# Patient Record
Sex: FEMALE | Race: WHITE | NOT HISPANIC OR LATINO | Employment: FULL TIME | URBAN - METROPOLITAN AREA
[De-identification: names, ages, dates, MRNs, and addresses within clinical notes are randomized per-mention and may not be internally consistent; named-entity substitution may affect disease eponyms.]

---

## 2018-07-26 ENCOUNTER — TELEPHONE (OUTPATIENT)
Dept: RADIOLOGY | Facility: MEDICAL CENTER | Age: 33
End: 2018-07-26

## 2018-07-28 ENCOUNTER — OFFICE VISIT (OUTPATIENT)
Dept: URGENT CARE | Facility: CLINIC | Age: 33
End: 2018-07-28
Payer: COMMERCIAL

## 2018-07-28 VITALS
HEART RATE: 64 BPM | OXYGEN SATURATION: 97 % | DIASTOLIC BLOOD PRESSURE: 70 MMHG | BODY MASS INDEX: 21.67 KG/M2 | SYSTOLIC BLOOD PRESSURE: 116 MMHG | WEIGHT: 143 LBS | HEIGHT: 68 IN | RESPIRATION RATE: 16 BRPM | TEMPERATURE: 97 F

## 2018-07-28 DIAGNOSIS — H10.89 OTHER CONJUNCTIVITIS OF LEFT EYE: ICD-10-CM

## 2018-07-28 PROCEDURE — 99202 OFFICE O/P NEW SF 15 MIN: CPT | Performed by: PHYSICIAN ASSISTANT

## 2018-07-28 RX ORDER — OFLOXACIN 3 MG/ML
SOLUTION/ DROPS OPHTHALMIC
Qty: 5 ML | Refills: 0 | Status: SHIPPED | OUTPATIENT
Start: 2018-07-28 | End: 2018-09-14

## 2018-07-28 RX ORDER — ERYTHROMYCIN 5 MG/G
OINTMENT OPHTHALMIC
Qty: 1 TUBE | Refills: 0 | Status: SHIPPED | OUTPATIENT
Start: 2018-07-28 | End: 2018-09-14

## 2018-07-28 ASSESSMENT — ENCOUNTER SYMPTOMS
NAUSEA: 0
EYE PAIN: 0
EYE DISCHARGE: 1
BLURRED VISION: 0
EYE REDNESS: 1
PHOTOPHOBIA: 0
EYE ITCHING: 0
DOUBLE VISION: 0
CONSTITUTIONAL NEGATIVE: 1
FOREIGN BODY SENSATION: 0
FEVER: 0

## 2018-07-28 NOTE — PROGRESS NOTES
"Subjective:      Carmen Brown is a 33 y.o. female who presents with Conjunctivitis ((R) eye redness and itchiness x 1 day)            Eye Problem    The left eye is affected. This is a new (redn, irrit, dc) problem. The current episode started today. The problem occurs constantly. The problem has been unchanged. There was no injury mechanism. Associated symptoms include an eye discharge and eye redness. Pertinent negatives include no blurred vision, double vision, fever, foreign body sensation, itching, nausea, photophobia or recent URI. She has tried nothing for the symptoms. The treatment provided no relief.       Review of Systems   Constitutional: Negative.  Negative for fever.   HENT: Negative.    Eyes: Positive for discharge and redness. Negative for blurred vision, double vision, photophobia, pain and itching.   Gastrointestinal: Negative for nausea.   Skin: Negative.           Objective:     /70   Pulse 64   Temp 36.1 °C (97 °F)   Resp 16   Ht 1.727 m (5' 8\")   Wt 64.9 kg (143 lb)   SpO2 97%   BMI 21.74 kg/m²      Physical Exam   Constitutional: She appears well-developed and well-nourished. No distress.   HENT:   Head: Normocephalic and atraumatic.   Eyes: Pupils are equal, round, and reactive to light. EOM are normal.   Neck: Normal range of motion. Neck supple.   Skin: Skin is warm and dry.   Psychiatric: She has a normal mood and affect. Her behavior is normal.   Nursing note and vitals reviewed.    Active Ambulatory Problems     Diagnosis Date Noted   • No Active Ambulatory Problems     Resolved Ambulatory Problems     Diagnosis Date Noted   • No Resolved Ambulatory Problems     No Additional Past Medical History     No current outpatient prescriptions on file prior to visit.     No current facility-administered medications on file prior to visit.      Social History     Social History   • Marital status: Single     Spouse name: N/A   • Number of children: N/A   • Years of education: N/A "     Occupational History   • Not on file.     Social History Main Topics   • Smoking status: Former Smoker     Types: Cigarettes   • Smokeless tobacco: Never Used   • Alcohol use Not on file   • Drug use: Unknown   • Sexual activity: Not on file     Other Topics Concern   • Not on file     Social History Narrative   • No narrative on file     History reviewed. No pertinent family history.  Patient has no known allergies.              Assessment/Plan:     ·  OS conj      · rx abx

## 2018-08-01 ENCOUNTER — HOSPITAL ENCOUNTER (OUTPATIENT)
Dept: RADIOLOGY | Facility: MEDICAL CENTER | Age: 33
End: 2018-08-01
Attending: OBSTETRICS & GYNECOLOGY
Payer: COMMERCIAL

## 2018-08-01 DIAGNOSIS — N63.0 LUMP OR MASS IN BREAST: ICD-10-CM

## 2018-08-01 PROCEDURE — 76642 ULTRASOUND BREAST LIMITED: CPT | Mod: LT

## 2018-08-01 PROCEDURE — G0279 TOMOSYNTHESIS, MAMMO: HCPCS

## 2018-09-14 ENCOUNTER — OFFICE VISIT (OUTPATIENT)
Dept: INTERNAL MEDICINE | Facility: MEDICAL CENTER | Age: 33
End: 2018-09-14
Payer: COMMERCIAL

## 2018-09-14 VITALS
HEART RATE: 74 BPM | HEIGHT: 68 IN | OXYGEN SATURATION: 97 % | WEIGHT: 146.6 LBS | BODY MASS INDEX: 22.22 KG/M2 | TEMPERATURE: 98.6 F | SYSTOLIC BLOOD PRESSURE: 110 MMHG | DIASTOLIC BLOOD PRESSURE: 60 MMHG

## 2018-09-14 DIAGNOSIS — Z00.00 PREVENTATIVE HEALTH CARE: ICD-10-CM

## 2018-09-14 DIAGNOSIS — Z13.29 SCREENING FOR ENDOCRINE, METABOLIC, AND IMMUNITY DISORDER: ICD-10-CM

## 2018-09-14 DIAGNOSIS — G89.29 CHRONIC ABDOMINAL PAIN: ICD-10-CM

## 2018-09-14 DIAGNOSIS — B19.20 HEPATITIS C VIRUS INFECTION WITHOUT HEPATIC COMA, UNSPECIFIED CHRONICITY: ICD-10-CM

## 2018-09-14 DIAGNOSIS — Z13.0 SCREENING FOR ENDOCRINE, METABOLIC, AND IMMUNITY DISORDER: ICD-10-CM

## 2018-09-14 DIAGNOSIS — Z13.228 SCREENING FOR ENDOCRINE, METABOLIC, AND IMMUNITY DISORDER: ICD-10-CM

## 2018-09-14 DIAGNOSIS — R10.9 CHRONIC ABDOMINAL PAIN: ICD-10-CM

## 2018-09-14 PROCEDURE — 99204 OFFICE O/P NEW MOD 45 MIN: CPT | Mod: GC | Performed by: INTERNAL MEDICINE

## 2018-09-14 ASSESSMENT — PATIENT HEALTH QUESTIONNAIRE - PHQ9: CLINICAL INTERPRETATION OF PHQ2 SCORE: 0

## 2018-09-14 NOTE — LETTER
[facility]  Paty Scott M.D.  [address] [facility] [address]  [place] [address]  Fax: [phone]   Authorization for Release/Disclosure of   Protected Health Information   Name: WESLY BROWN : 1985 SSN: [id number]   [place] [address] [facility] [place] [address] Phone:    [phone] (home) [phone] (work)   I authorize the entity listed below to release/disclose the PHI below to:   [facility]/Paty Scott M.D. and Paty Scott M.D.   Provider or Entity Name: Dr. Omega NICOLE     Address   City, [facility]   Phone:      Fax:     Reason for request: continuity of care   Information to be released:    [  ] LAST COLONOSCOPY,  including any PATH REPORT and follow-up  [  ] LAST FIT/COLOGUARD RESULT [  ] LAST DEXA  [  ] LAST MAMMOGRAM  [ x ] LAST PAP  [  ] LAST LABS [  ] RETINA EXAM REPORT  [  ] IMMUNIZATION RECORDS  [  ] Release all info      [  ] Check here and initial the line next to each item to release ALL health information INCLUDING  _____ Care and treatment for drug and / or alcohol abuse  _____ HIV testing, infection status, or AIDS  _____ Genetic Testing    DATES OF SERVICE OR TIME PERIOD TO BE DISCLOSED: _____________  I understand and acknowledge that:  * This Authorization may be revoked at any time by you in writing, except if your health information has already been used or disclosed.  * Your health information that will be used or disclosed as a result of you signing this authorization could be re-disclosed by the recipient. If this occurs, your re-disclosed health information may no longer be protected by State or Federal laws.  * You may refuse to sign this Authorization. Your refusal will not affect your ability to obtain treatment.  * This Authorization becomes effective upon signing and will  on (date) __________.      If no date is indicated, this Authorization will  one (1) year from the signature date.    Name: Wesly Brown    Signature:   Date:     2018       PLEASE FAX REQUESTED RECORDS BACK TO: [phone]

## 2018-09-14 NOTE — PATIENT INSTRUCTIONS
Hepatitis C  Hepatitis C is a liver infection. It is caused by a germ that can spread through blood and other bodily fluids. Your doctor will use blood and liver tests to:  · Check for this infection.  · Decide how to treat you.  · Check your health after treatment.  Follow these instructions at home:  · Rest.  · Do not take any medicine unless your doctor says it is okay. This includes over-the-counter medicine and birth control pills.  · Do not drink alcohol.  · Do not have sex until your doctor says it is okay.  · Do not share toothbrushes, nail clippers, razors, or needles.  · Take all medicines as told by your doctor.  Contact a doctor if:  · You have a fever.  · Your belly (abdomen) hurts.  · Your pee (urine) is dark.  · Your poop (bowel movement) is the color of spencer.  · You have joint pain.  Get help right away if:  · You feel more and more tired (fatigued).  · You feel more and more weak.  · You do not feel like eating.  · You feel sick to your stomach (nauseous) or throw up (vomit).  · Your skin or the whites of your eyes turn yellow (jaundice) or turn more yellow than they were before.  · You bruise or bleed easily.  This information is not intended to replace advice given to you by your health care provider. Make sure you discuss any questions you have with your health care provider.  Document Released: 11/30/2009 Document Revised: 05/25/2017 Document Reviewed: 04/01/2015  [employer] Interactive Patient Education © 2017 [employer]  Abdominal Pain, Adult  Many things can cause belly (abdominal) pain. Most times, belly pain is not dangerous. Many cases of belly pain can be watched and treated at home. Sometimes belly pain is serious, though. Your doctor will try to find the cause of your belly pain.  Follow these instructions at home:  · Take over-the-counter and prescription medicines only as told by your doctor. Do not take medicines that help you poop (laxatives) unless told to by your  doctor.  · Drink enough fluid to keep your pee (urine) clear or pale yellow.  · Watch your belly pain for any changes.  · Keep all follow-up visits as told by your doctor. This is important.  Contact a doctor if:  · Your belly pain changes or gets worse.  · You are not hungry, or you lose weight without trying.  · You are having trouble pooping (constipated) or have watery poop (diarrhea) for more than 2-3 days.  · You have pain when you pee or poop.  · Your belly pain wakes you up at night.  · Your pain gets worse with meals, after eating, or with certain foods.  · You are throwing up and cannot keep anything down.  · You have a fever.  Get help right away if:  · Your pain does not go away as soon as your doctor says it should.  · You cannot stop throwing up.  · Your pain is only in areas of your belly, such as the right side or the left lower part of the belly.  · You have bloody or black poop, or poop that looks like tar.  · You have very bad pain, cramping, or bloating in your belly.  · You have signs of not having enough fluid or water in your body (dehydration), such as:  ¨ Dark pee, very little pee, or no pee.  ¨ Cracked lips.  ¨ Dry mouth.  ¨ Sunken eyes.  ¨ Sleepiness.  ¨ Weakness.  This information is not intended to replace advice given to you by your health care provider. Make sure you discuss any questions you have with your health care provider.  Document Released: 06/05/2009 Document Revised: 07/07/2017 Document Reviewed: 05/31/2017  [employer] Interactive Patient Education © 2017 [employer]

## 2018-09-14 NOTE — PROGRESS NOTES
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 Although she takes spicy foods she is not sure if this is related to that.    She endorses to be positive for hepatitis C serology from early 19 years of age, which she attributes for a transplacental  transmission from her mother who is HCV positive and has been treated with interferon. She had severe reactions to it. Scared with the sufferings her mother went through from interferon treatment,  she decided not to take treatment for hepatitis C.    Currently she declined any nausea, vomiting, GERD, postprandial bloating, diarrhea, constipation, fever, chills or any jaundice.    Personal history significant for; heavy abuse of cocaine, methamphetamine, marijuana during teenage years, but never injected IV drugs. She reported her nasal septum completely damaged from snorting of cocaine.     Currently  lives with her  and 7-year-old son, and is planning for second pregnancy, has undergone IUD removal last menstrual period 8/15/2018        Patient Active Problem List    Diagnosis Date Noted   • Chronic abdominal pain 09/14/2018   • Vitreous floaters of both eyes 12/05/2016   • Foot cramps 12/05/2016   • Need for vaccination 12/05/2016   • Preventative health care 12/05/2016   • Herpes genitalis in women 08/02/2011   • Smoker 03/21/2011   • Hepatitis C        Past Medical History:   Diagnosis Date   • Hepatitis C Dx 2002    no Txment       Current Outpatient Prescriptions   Medication Sig Dispense Refill   • tobramycin (TOBREX) 0.3 %  "Solution ophthalmic solution Place 1 Drop in right eye 4 times a day. 1 Bottle 0     No current facility-administered medications for this visit.        Allergies as of 09/14/2018   • (No Known Allergies)       Social History     Social History   • Marital status:      Spouse name: N/A   • Number of children: 1   • Years of education: Some college     Occupational History   •       [employer]      Social History Main Topics   • Smoking status: Former Smoker     Packs/day: 0.50     Years: 15.00     Types: Cigarettes     Start date: 1/1/1999     Quit date: 5/4/2018   • Smokeless tobacco: Never Used      Comment: june 2018 quit   • Alcohol use 0.6 oz/week     1 Shots of liquor per week      Comment: Only on weekends   • Drug use: Yes     Types: Marijuana, Methamphetamines, Cocaine      Comment: last used 05/10   • Sexual activity: Yes     Partners: Male      Comment: unplanned pregnancy, FOB is not supportive     Other Topics Concern   • Seat Belt Yes     Social History Narrative    Lives with  and son 7 yrs old            Family History   Problem Relation Age of Onset   • Lung Disease Maternal Aunt         smoker   • Lung Disease Mother         smoker   • Hyperlipidemia Mother    • Diabetes Sister         no meds   • Lung Disease Maternal Grandmother    • Cancer Paternal Grandmother         Breast       Past Surgical History:   Procedure Laterality Date   • PRIMARY C SECTION  9/13/2011    Performed by JEREMIE MIRAMONTES at [facility]       ROS: As per HPI. Additional pertinent symptoms as noted below.    Negative for jaundice  Negative for arthralgias  Negative for anorexia, weight loss  Negative for hematochezia or melena  Negative for pruritus    /60   Pulse 74   Temp 37 °C (98.6 °F)   Ht 1.727 m (5' 8\")   Wt 66.5 kg (146 lb 9.6 oz)   SpO2 97%   BMI 22.29 kg/m²     Physical Exam;   done in presence of female chaperone TYLER Christianson    General:  " Pleasant patient, average built , afebrile, anicteric     Alert and oriented, No apparent distress.    Eyes: Pupils equal and reactive. No scleral icterus.    Nose; severely atrophied mucosa and nasal septum completely perforated brownish-black-colored    Throat: Clear no erythema or exudates noted.    Neck: Supple. No lymphadenopathy noted. Thyroid not enlarged.    Lungs: Clear to auscultation and percussion bilaterally.    Cardiovascular: Regular rate and rhythm. No murmurs, rubs or gallops.    Abdomen:  Soft nontender no hepatosplenomegaly   Bowel sounds normal   No masses felt       Extremities: No clubbing, cyanosis, edema.    Skin: Clear. No rash or suspicious skin lesions noted.  Several tattoos all over the abdominal wall    Other:   Mood and affect normal    Assessment and Plan    1. Chronic abdominal pain  2. 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 Preventative health care  Patient is due for influenza immunization  Patient declined vaccine    4. Screening for endocrine, metabolic, and immunity disorder  Lipid panel, hemoglobin A1c, TSH    Follow up in 3 months after lab workup and infectious disease opinion      Risk Assessment (discuss potential complications a function of chronic problems): As above    Complexity (discuss number of co-morbidities): As above    Signed by: Paty  JOCELYN Scott.

## 2018-09-27 LAB
ALBUMIN SERPL-MCNC: 4.5 G/DL (ref 3.5–5.5)
ALBUMIN/GLOB SERPL: 1.6 {RATIO} (ref 1.2–2.2)
ALP SERPL-CCNC: 69 IU/L (ref 39–117)
ALT SERPL-CCNC: 23 IU/L (ref 0–32)
AST SERPL-CCNC: 22 IU/L (ref 0–40)
BASOPHILS # BLD AUTO: 0 X10E3/UL (ref 0–0.2)
BASOPHILS NFR BLD AUTO: 0 %
BILIRUB SERPL-MCNC: 1.5 MG/DL (ref 0–1.2)
BUN SERPL-MCNC: 12 MG/DL (ref 6–20)
BUN/CREAT SERPL: 14 (ref 9–23)
CALCIUM SERPL-MCNC: 9.2 MG/DL (ref 8.7–10.2)
CHLORIDE SERPL-SCNC: 104 MMOL/L (ref 96–106)
CHOLEST SERPL-MCNC: 137 MG/DL (ref 100–199)
CO2 SERPL-SCNC: 22 MMOL/L (ref 20–29)
CREAT SERPL-MCNC: 0.85 MG/DL (ref 0.57–1)
EOSINOPHIL # BLD AUTO: 0.1 X10E3/UL (ref 0–0.4)
EOSINOPHIL NFR BLD AUTO: 3 %
ERYTHROCYTE [DISTWIDTH] IN BLOOD BY AUTOMATED COUNT: 13.2 % (ref 12.3–15.4)
GLOBULIN SER CALC-MCNC: 2.9 G/DL (ref 1.5–4.5)
GLUCOSE SERPL-MCNC: 90 MG/DL (ref 65–99)
HAV IGM SERPL QL IA: NEGATIVE
HBA1C MFR BLD: 5.3 % (ref 4.8–5.6)
HBV CORE IGM SERPL QL IA: NEGATIVE
HBV SURFACE AG SERPL QL IA: NEGATIVE
HCT VFR BLD AUTO: 42 % (ref 34–46.6)
HCV AB S/CO SERPL IA: >11 S/CO RATIO (ref 0–0.9)
HCV GENTYP SERPL NAA+PROBE: NORMAL
HCV RNA SERPL NAA+PROBE-ACNC: NORMAL IU/ML
HCV RNA SERPL NAA+PROBE-LOG IU: 6.58 LOG10 IU/ML
HDLC SERPL-MCNC: 46 MG/DL
HEPATITIS C GENOTYPING 551222: NORMAL
HGB BLD-MCNC: 14.5 G/DL (ref 11.1–15.9)
IF AFRICAN AMERICAN  100797: 103 ML/MIN/1.73
IF NON AFRICAN AMER 100791: 90 ML/MIN/1.73
IMM GRANULOCYTES # BLD: 0 X10E3/UL (ref 0–0.1)
IMM GRANULOCYTES NFR BLD: 0 %
IMMATURE CELLS  115398: NORMAL
LABORATORY COMMENT REPORT: NORMAL
LABORATORY COMMENT REPORT: NORMAL
LDLC SERPL CALC-MCNC: 78 MG/DL (ref 0–99)
LYMPHOCYTES # BLD AUTO: 1.5 X10E3/UL (ref 0.7–3.1)
LYMPHOCYTES NFR BLD AUTO: 34 %
MCH RBC QN AUTO: 30.7 PG (ref 26.6–33)
MCHC RBC AUTO-ENTMCNC: 34.5 G/DL (ref 31.5–35.7)
MCV RBC AUTO: 89 FL (ref 79–97)
MONOCYTES # BLD AUTO: 0.5 X10E3/UL (ref 0.1–0.9)
MONOCYTES NFR BLD AUTO: 11 %
MORPHOLOGY BLD-IMP: NORMAL
NEUTROPHILS # BLD AUTO: 2.3 X10E3/UL (ref 1.4–7)
NEUTROPHILS NFR BLD AUTO: 52 %
NRBC BLD AUTO-RTO: NORMAL %
PLATELET # BLD AUTO: 227 X10E3/UL (ref 150–379)
POTASSIUM SERPL-SCNC: 4.4 MMOL/L (ref 3.5–5.2)
PROT SERPL-MCNC: 7.4 G/DL (ref 6–8.5)
RBC # BLD AUTO: 4.73 X10E6/UL (ref 3.77–5.28)
SODIUM SERPL-SCNC: 139 MMOL/L (ref 134–144)
TEST INFO 550306: NORMAL
TRIGL SERPL-MCNC: 63 MG/DL (ref 0–149)
VLDLC SERPL CALC-MCNC: 13 MG/DL (ref 5–40)
WBC # BLD AUTO: 4.5 X10E3/UL (ref 3.4–10.8)

## 2018-09-28 ENCOUNTER — TELEPHONE (OUTPATIENT)
Dept: INTERNAL MEDICINE | Facility: MEDICAL CENTER | Age: 33
End: 2018-09-28

## 2018-09-28 NOTE — TELEPHONE ENCOUNTER
Incoming referral from Dr Scott to Dr Castro.  Dx: Hepatitis C      Pt got all labs completed except fibrosure.   Per a telephone encounter, pt has a high co-pay for her to get her U/S done.       Please review and advise.

## 2018-09-28 NOTE — TELEPHONE ENCOUNTER
1. Caller Name: Pt                      Call Back Number: [phone] (home) [phone] (work)    2. Message: Patient called and left a message stating she would like her lab results. She also has a question stating that she was told her co-pay would be really high for the US and is wondering if she should she still keep that appt.    3. Patient approves office to leave a detailed voicemail/MyChart message: N\A

## 2018-10-02 ENCOUNTER — HOSPITAL ENCOUNTER (OUTPATIENT)
Dept: RADIOLOGY | Facility: MEDICAL CENTER | Age: 33
End: 2018-10-02
Attending: STUDENT IN AN ORGANIZED HEALTH CARE EDUCATION/TRAINING PROGRAM
Payer: COMMERCIAL

## 2018-10-02 DIAGNOSIS — B19.20 HEPATITIS C VIRUS INFECTION WITHOUT HEPATIC COMA, UNSPECIFIED CHRONICITY: ICD-10-CM

## 2018-10-02 DIAGNOSIS — R10.9 CHRONIC ABDOMINAL PAIN: ICD-10-CM

## 2018-10-02 DIAGNOSIS — G89.29 CHRONIC ABDOMINAL PAIN: ICD-10-CM

## 2018-10-02 PROCEDURE — 76700 US EXAM ABDOM COMPLETE: CPT

## 2018-10-02 NOTE — TELEPHONE ENCOUNTER
Called the patient and left voice mail, as she did not  my phone.    Given her Hepatitis C we have ordered US liver.

## 2018-10-12 NOTE — TELEPHONE ENCOUNTER
Called and spoke with pt. Pt said she does not want to start the process on the tx yet. But would like to discuss this first with Dr Scott at her appt. I told pt I'm going to close her referral and when she decides that she would like to see Dr Castro she would have to ask her PCP for a new referral. Pt understood.

## 2019-01-09 ENCOUNTER — OFFICE VISIT (OUTPATIENT)
Dept: INTERNAL MEDICINE | Facility: MEDICAL CENTER | Age: 34
End: 2019-01-09
Payer: COMMERCIAL

## 2019-01-09 VITALS
TEMPERATURE: 98 F | WEIGHT: 152 LBS | SYSTOLIC BLOOD PRESSURE: 110 MMHG | HEIGHT: 68 IN | OXYGEN SATURATION: 98 % | HEART RATE: 80 BPM | BODY MASS INDEX: 23.04 KG/M2 | DIASTOLIC BLOOD PRESSURE: 70 MMHG

## 2019-01-09 DIAGNOSIS — G89.29 CHRONIC ABDOMINAL PAIN: ICD-10-CM

## 2019-01-09 DIAGNOSIS — B19.20 HEPATITIS C VIRUS INFECTION WITHOUT HEPATIC COMA, UNSPECIFIED CHRONICITY: ICD-10-CM

## 2019-01-09 DIAGNOSIS — R10.9 CHRONIC ABDOMINAL PAIN: ICD-10-CM

## 2019-01-09 PROCEDURE — 99213 OFFICE O/P EST LOW 20 MIN: CPT | Mod: GE | Performed by: INTERNAL MEDICINE

## 2019-01-09 ASSESSMENT — PATIENT HEALTH QUESTIONNAIRE - PHQ9: CLINICAL INTERPRETATION OF PHQ2 SCORE: 0

## 2019-01-09 NOTE — PROGRESS NOTES
"Established Patient    Carmen presents today with the following:    CC: hepatitis C, and abdominal pain follow up of lab workup.    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 Currently she  reports her pain has gotten better, and declined any acute symptoms.    Her Abdominal Ultrasound showed  small kidney stone (8.1 mm shadowing nonobstructing stone within the mid left kidney), The liver is normal in contour. There is no evidence of solid mass lesion. The liver measures 15.19 cm.    Her Lab work up shows elevated HCV antibody levels 11.o and Quant 3,850,000, with Genotype 2a/2c, however she is not willing for any treatment at this time. AST/ALt are normal.      Patient Active Problem List    Diagnosis Date Noted   • Chronic abdominal pain 09/14/2018   • Vitreous floaters of both eyes 12/05/2016   • Foot cramps 12/05/2016   • Need for vaccination 12/05/2016   • Preventative health care 12/05/2016   • Herpes genitalis in women 08/02/2011   • Smoker 03/21/2011   • Hepatitis C        No current outpatient prescriptions on file.     No current facility-administered medications for this visit.        ROS: As per HPI. Additional pertinent symptoms as noted below.    Negative for nausea, vomiting, jaundice or hematochezia    /70 (BP Location: Left arm, Patient Position: Sitting, BP Cuff Size: Adult)   Pulse 80   Temp 36.7 °C (98 °F) (Temporal)   Ht 1.727 m (5' 8\")   Wt 68.9 kg (152 lb)   SpO2 98%   BMI 23.11 kg/m²     Physical Exam   Constitutional:  oriented to person, place, and time. No distress.   Eyes: Pupils are equal, round, and reactive to light. No scleral icterus.  Neck: Neck supple. No thyromegaly present.   Cardiovascular: Normal rate, regular rhythm and normal heart sounds.  Exam reveals no " gallop and no friction rub.  No murmur heard.  Pulmonary/Chest: Breath sounds normal. Chest wall is not dull to percussion.   Musculoskeletal:   no edema.   Lymphadenopathy: no cervical adenopathy  Neurological: alert and oriented to person, place, and time.   Skin: No cyanosis. Nails show no clubbing.  Other: Mood and affect normal      Assessment and Plan    1. Hepatitis C virus infection without hepatic coma, unspecified chronicity  Hx of Hep C from the age of 16, pt has not taken any treatment    Labs done on 9/22/2018 show:    Component Value Ref Range & Units Status   Hepatitis A Virus Ab, IgM Negative  Negative Final   Hepatitis B Surface Antigen Negative  Negative Final   Hepatitis B Cors Ab,IgM Negative  Negative Final   Hepatitis C Antibody >11.0   0.0 - 0.9 s/co ratio Final   Comment:   Negative:     < 0.8   Indeterminate: 0.8 - 0.9   Positive:     > 0.9   The CDC recommends that a positive HCV antibody result   be followed up with a HCV Nucleic Acid Amplification   test ([id number]).          Component Value Ref Range & Units Status   Hepatitis C Quant 3,850,000  IU/mL Final   HCV Log 10 6.585  log10 IU/mL Final   Test Info: Comment   Final   Comment:   The quantitative range of this assay is 15 IU/mL to 100 million IU/mL.   Hepatitis C Genotyping Comment   Final         Lab Results   Component Value Date/Time    CHOLSTRLTOT 137 09/22/2018 09:24 AM    CHOLSTRLTOT 114 12/17/2016 09:11 AM    LDL 78 09/22/2018 09:24 AM    LDL 67 12/17/2016 09:11 AM    HDL 46 09/22/2018 09:24 AM    HDL 37 (A) 12/17/2016 09:11 AM    TRIGLYCERIDE 63 09/22/2018 09:24 AM    TRIGLYCERIDE 52 12/17/2016 09:11 AM       Lab Results   Component Value Date/Time    SODIUM 139 09/22/2018 09:24 AM    SODIUM 139 12/17/2016 09:11 AM    POTASSIUM 4.4 09/22/2018 09:24 AM    POTASSIUM 4.5 12/17/2016 09:11 AM    CHLORIDE 104 09/22/2018 09:24 AM    CHLORIDE 108 12/17/2016 09:11 AM    CO2 22 09/22/2018 09:24 AM    CO2 27 12/17/2016 09:11 AM     GLUCOSE 90 09/22/2018 09:24 AM    GLUCOSE 76 12/17/2016 09:11 AM    BUN 12 09/22/2018 09:24 AM    BUN 12 12/17/2016 09:11 AM    CREATININE 0.85 09/22/2018 09:24 AM    CREATININE 0.77 12/17/2016 09:11 AM    BUNCREATRAT 14 09/22/2018 09:24 AM     Lab Results   Component Value Date/Time    ALKPHOSPHAT 69 09/22/2018 09:24 AM    ALKPHOSPHAT 48 12/17/2016 09:11 AM    ASTSGOT 22 09/22/2018 09:24 AM    ASTSGOT 22 12/17/2016 09:11 AM    ALTSGPT 23 09/22/2018 09:24 AM    ALTSGPT 25 12/17/2016 09:11 AM    TBILIRUBIN 1.5 (H) 09/22/2018 09:24 AM    TBILIRUBIN 1.3 12/17/2016 09:11 AM      Referral infectious disease specialist was given in previous visit      2. 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  8.1 mm nonobstructing stone left kidney. No hydronephrosis.  No free fluid.  The liver is normal in contour. There is no evidence of solid mass lesion. The liver measures 15.19 cm.      Patient is advised  drinking water frequently to keep well hydrated , and see if she pass stone in urine.    If she  develop severe pain abdomen or difficulty in urination or blood in the urine, she is advised to  go to nearest ED, or call for an earlier appointment with us.    Pt wants to go for 2nd baby and is planning to establish care with OBGyn. Counseled about risk of transplacental transmission.    Otherwise Fup in 3 months.      : Paty Scott M.D.

## 2019-01-09 NOTE — PATIENT INSTRUCTIONS
Hepatitis C  Hepatitis C is a liver infection. It is caused by a germ that can spread through blood and other bodily fluids. Your doctor will use blood and liver tests to:  · Check for this infection.  · Decide how to treat you.  · Check your health after treatment.  Follow these instructions at home:  · Rest.  · Do not take any medicine unless your doctor says it is okay. This includes over-the-counter medicine and birth control pills.  · Do not drink alcohol.  · Do not have sex until your doctor says it is okay.  · Do not share toothbrushes, nail clippers, razors, or needles.  · Take all medicines as told by your doctor.  Contact a doctor if:  · You have a fever.  · Your belly (abdomen) hurts.  · Your pee (urine) is dark.  · Your poop (bowel movement) is the color of spencer.  · You have joint pain.  Get help right away if:  · You feel more and more tired (fatigued).  · You feel more and more weak.  · You do not feel like eating.  · You feel sick to your stomach (nauseous) or throw up (vomit).  · Your skin or the whites of your eyes turn yellow (jaundice) or turn more yellow than they were before.  · You bruise or bleed easily.  This information is not intended to replace advice given to you by your health care provider. Make sure you discuss any questions you have with your health care provider.  Document Released: 11/30/2009 Document Revised: 05/25/2017 Document Reviewed: 04/01/2015  [employer] Interactive Patient Education © 2017 [employer]

## 2021-06-11 ENCOUNTER — TELEPHONE (OUTPATIENT)
Dept: SCHEDULING | Facility: IMAGING CENTER | Age: 36
End: 2021-06-11

## 2021-06-24 ENCOUNTER — OFFICE VISIT (OUTPATIENT)
Dept: MEDICAL GROUP | Age: 36
End: 2021-06-24
Payer: COMMERCIAL

## 2021-06-24 VITALS
HEIGHT: 67 IN | SYSTOLIC BLOOD PRESSURE: 112 MMHG | WEIGHT: 151 LBS | OXYGEN SATURATION: 98 % | HEART RATE: 86 BPM | DIASTOLIC BLOOD PRESSURE: 64 MMHG | TEMPERATURE: 98.8 F | BODY MASS INDEX: 23.7 KG/M2

## 2021-06-24 DIAGNOSIS — B18.2 CHRONIC HEPATITIS C WITHOUT HEPATIC COMA (HCC): ICD-10-CM

## 2021-06-24 DIAGNOSIS — Z3A.01 LESS THAN 8 WEEKS GESTATION OF PREGNANCY: ICD-10-CM

## 2021-06-24 DIAGNOSIS — E55.9 VITAMIN D DEFICIENCY: ICD-10-CM

## 2021-06-24 DIAGNOSIS — N91.2 AMENORRHEA, UNSPECIFIED: ICD-10-CM

## 2021-06-24 DIAGNOSIS — R73.01 IFG (IMPAIRED FASTING GLUCOSE): ICD-10-CM

## 2021-06-24 DIAGNOSIS — Z00.00 HEALTHCARE MAINTENANCE: ICD-10-CM

## 2021-06-24 PROBLEM — G89.29 CHRONIC ABDOMINAL PAIN: Status: RESOLVED | Noted: 2018-09-14 | Resolved: 2021-06-24

## 2021-06-24 PROBLEM — R10.9 CHRONIC ABDOMINAL PAIN: Status: RESOLVED | Noted: 2018-09-14 | Resolved: 2021-06-24

## 2021-06-24 PROBLEM — R76.8 HEPATITIS C ANTIBODY TEST POSITIVE: Status: ACTIVE | Noted: 2021-06-24

## 2021-06-24 PROCEDURE — 99204 OFFICE O/P NEW MOD 45 MIN: CPT | Performed by: INTERNAL MEDICINE

## 2021-06-24 RX ORDER — CHLORAL HYDRATE 500 MG
1000 CAPSULE ORAL
Status: ON HOLD | COMMUNITY
End: 2022-01-23

## 2021-06-24 RX ORDER — MULTIVIT WITH MINERALS/LUTEIN
TABLET ORAL
Status: ON HOLD | COMMUNITY
End: 2022-01-23

## 2021-06-24 ASSESSMENT — ENCOUNTER SYMPTOMS
PSYCHIATRIC NEGATIVE: 1
GASTROINTESTINAL NEGATIVE: 1
NEUROLOGICAL NEGATIVE: 1
MUSCULOSKELETAL NEGATIVE: 1
CONSTITUTIONAL NEGATIVE: 1
EYES NEGATIVE: 1
RESPIRATORY NEGATIVE: 1
CARDIOVASCULAR NEGATIVE: 1

## 2021-06-24 ASSESSMENT — PATIENT HEALTH QUESTIONNAIRE - PHQ9: CLINICAL INTERPRETATION OF PHQ2 SCORE: 0

## 2021-06-24 NOTE — PROGRESS NOTES
Subjective:      Carmen Brown is a 36 y.o. female who presents with Establish Care and Referral Needed (OBGYN prenatal vitamins needed )  There is a new patient here to get established for now has insurance and needs referral to OB/GYN for positive urine pregnancy test.  Last mental period was 6 weeks ago.  She had one uncomplicated pregnancy 10 years ago.  No new problems or issues.  She quit smoking 2 to 3 years ago.  She has had a little bit of morning sickness but otherwise has been feeling well.    Past medical history significant for hepatitis C positivity which she believes was contracted in utero from her mother who was positive for hepatitis C.  She declined treatment at that time was discovered due to side effects of potential treatment with interferon.  Has not been inclined to seek the newer treatments with Harvoni or other medications.    Health maintenance patient is due for hepatitis B vaccination and has declined COVID-19 vaccination, along with her .    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  , one 10-year-old child at home.    Patient has no known allergies.      Outpatient Medications Prior to Visit   Medication Sig Dispense Refill   • Probiotic Product (PROBIOTIC ADVANCED PO) Take  by mouth.     • Ascorbic Acid (VITAMIN C) 1000 MG Tab Take  by mouth.     • Omega-3 Fatty Acids (FISH OIL) 1000 MG Cap capsule Take 1,000 mg by mouth 3 times a day with meals.       No facility-administered medications prior to visit.     No Known Allergies  No visits with results within 1 Month(s) from this visit.   Latest known visit with results is:   Orders Only on 09/22/2018   Component Date Value   • WBC 09/22/2018 4.5    • RBC 09/22/2018 4.73    • Hemoglobin 09/22/2018 14.5    • Hematocrit 09/22/2018 42.0    • MCV 09/22/2018 89    • MCH 09/22/2018 30.7    • MCHC 09/22/2018 34.5    • RDW 09/22/2018 13.2    • Platelet Count 09/22/2018 227    • Neutrophils-Polys  09/22/2018 52    • Lymphocytes 09/22/2018 34    • Monocytes 09/22/2018 11    • Eosinophils 09/22/2018 3    • Basophils 09/22/2018 0    • Immature Cells 09/22/2018 CANCELED    • Neutrophils (Absolute) 09/22/2018 2.3    • Lymphs (Absolute) 09/22/2018 1.5    • Monos (Absolute) 09/22/2018 0.5    • Eos (Absolute) 09/22/2018 0.1    • Baso (Absolute) 09/22/2018 0.0    • Immature Granulocytes 09/22/2018 0    • Immature Granulocytes (a* 09/22/2018 0.0    • Nucleated RBC 09/22/2018 CANCELED    • Comments-Diff 09/22/2018 CANCELED    • Glucose 09/22/2018 90    • Bun 09/22/2018 12    • Creatinine 09/22/2018 0.85    • GFR If Non  [employer]meri* 09/22/2018 90    • GFR If  09/22/2018 103    • Bun-Creatinine Ratio 09/22/2018 14    • Sodium 09/22/2018 139    • Potassium 09/22/2018 4.4    • Chloride 09/22/2018 104    • Co2 09/22/2018 22    • Calcium 09/22/2018 9.2    • Total Protein 09/22/2018 7.4    • Albumin 09/22/2018 4.5    • Globulin 09/22/2018 2.9    • A-G Ratio 09/22/2018 1.6    • Total Bilirubin 09/22/2018 1.5*   • Alkaline Phosphatase 09/22/2018 69    • AST(SGOT) 09/22/2018 22    • ALT(SGPT) 09/22/2018 23    • Cholesterol,Tot 09/22/2018 137    • Triglycerides 09/22/2018 63    • HDL 09/22/2018 46    • VLDL Cholesterol Calc 09/22/2018 13    • LDL 09/22/2018 78    • Comment: 09/22/2018 CANCELED    • Hepatitis C Quant 09/22/2018 3,850,000    • HCV Log 10 09/22/2018 6.585    • Test Info: 09/22/2018 Comment    • Hepatitis C Genotyping 09/22/2018 Comment    • Hepatitis C Genotyping 09/22/2018 2a/2c    • Please Note: 09/22/2018 Comment    • Hepatitis A Virus Ab, IgM 09/22/2018 Negative    • Hepatitis B Surface Anti* 09/22/2018 Negative    • Hepatitis B Cors Ab,IgM 09/22/2018 Negative    • Hepatitis C Antibody 09/22/2018 >11.0*   • Glycohemoglobin 09/22/2018 5.3       Lab Results   Component Value Date/Time    HBA1C 5.3 09/22/2018 09:24 AM     Lab Results   Component Value Date/Time    SODIUM 139 09/22/2018 09:24 AM     "SODIUM 139 12/17/2016 09:11 AM    POTASSIUM 4.4 09/22/2018 09:24 AM    POTASSIUM 4.5 12/17/2016 09:11 AM    CHLORIDE 104 09/22/2018 09:24 AM    CHLORIDE 108 12/17/2016 09:11 AM    CO2 22 09/22/2018 09:24 AM    CO2 27 12/17/2016 09:11 AM    GLUCOSE 90 09/22/2018 09:24 AM    GLUCOSE 76 12/17/2016 09:11 AM    BUN 12 09/22/2018 09:24 AM    BUN 12 12/17/2016 09:11 AM    CREATININE 0.85 09/22/2018 09:24 AM    CREATININE 0.77 12/17/2016 09:11 AM    BUNCREATRAT 14 09/22/2018 09:24 AM    ALKPHOSPHAT 69 09/22/2018 09:24 AM    ALKPHOSPHAT 48 12/17/2016 09:11 AM    ASTSGOT 22 09/22/2018 09:24 AM    ASTSGOT 22 12/17/2016 09:11 AM    ALTSGPT 23 09/22/2018 09:24 AM    ALTSGPT 25 12/17/2016 09:11 AM    TBILIRUBIN 1.5 (H) 09/22/2018 09:24 AM    TBILIRUBIN 1.3 12/17/2016 09:11 AM     No results found for: INR  Lab Results   Component Value Date/Time    CHOLSTRLTOT 137 09/22/2018 09:24 AM    CHOLSTRLTOT 114 12/17/2016 09:11 AM    LDL 78 09/22/2018 09:24 AM    LDL 67 12/17/2016 09:11 AM    HDL 46 09/22/2018 09:24 AM    HDL 37 (A) 12/17/2016 09:11 AM    TRIGLYCERIDE 63 09/22/2018 09:24 AM    TRIGLYCERIDE 52 12/17/2016 09:11 AM       No results found for: TESTOSTERONE  No results found for: TSH  No results found for: FREET4  No results found for: URICACID  No components found for: VITB12  No results found for: 25HYDROXY                HPI    Review of Systems   Constitutional: Negative.    HENT: Negative.    Eyes: Negative.    Respiratory: Negative.    Cardiovascular: Negative.    Gastrointestinal: Negative.    Genitourinary: Negative.    Musculoskeletal: Negative.    Skin: Negative.    Neurological: Negative.    Endo/Heme/Allergies: Negative.    Psychiatric/Behavioral: Negative.           Objective:     /64 (BP Location: Left arm, Patient Position: Sitting, BP Cuff Size: Adult)   Pulse 86   Temp 37.1 °C (98.8 °F) (Temporal)   Ht 1.702 m (5' 7\")   Wt 68.5 kg (151 lb)   SpO2 98%   BMI 23.65 kg/m²      Physical Exam  Vitals " reviewed.   Constitutional:       General: She is not in acute distress.     Appearance: She is well-developed. She is not diaphoretic.   HENT:      Head: Normocephalic and atraumatic.      Right Ear: External ear normal.      Left Ear: External ear normal.      Nose: Nose normal.      Mouth/Throat:      Pharynx: No oropharyngeal exudate.   Eyes:      General: No scleral icterus.        Right eye: No discharge.         Left eye: No discharge.      Conjunctiva/sclera: Conjunctivae normal.      Pupils: Pupils are equal, round, and reactive to light.   Neck:      Thyroid: No thyromegaly.      Vascular: No JVD.      Trachea: No tracheal deviation.   Cardiovascular:      Rate and Rhythm: Normal rate and regular rhythm.      Heart sounds: Normal heart sounds. No murmur heard.   No friction rub. No gallop.    Pulmonary:      Effort: Pulmonary effort is normal. No respiratory distress.      Breath sounds: Normal breath sounds. No stridor. No wheezing or rales.   Chest:      Chest wall: No tenderness.   Abdominal:      General: Bowel sounds are normal. There is no distension.      Palpations: Abdomen is soft. There is no mass.      Tenderness: There is no abdominal tenderness. There is no guarding or rebound.   Musculoskeletal:         General: No tenderness. Normal range of motion.      Cervical back: Normal range of motion and neck supple.   Lymphadenopathy:      Cervical: No cervical adenopathy.   Skin:     General: Skin is warm and dry.      Coloration: Skin is not pale.      Findings: No erythema or rash.   Neurological:      Mental Status: She is alert and oriented to person, place, and time.      Cranial Nerves: No cranial nerve deficit.      Motor: No abnormal muscle tone.      Coordination: Coordination normal.      Deep Tendon Reflexes: Reflexes are normal and symmetric. Reflexes normal.   Psychiatric:         Behavior: Behavior normal.         Thought Content: Thought content normal.         Judgment: Judgment  normal.                         Assessment/Plan:        1. Amenorrhea, unspecified-most likely secondary to pregnancy.  Referral to OB/GYN.  Check serum pregnancy test call results.     - HCG QUAL SERUM; Future  - REFERRAL TO OB/GYN    2. Less than 8 weeks gestation of pregnancy       - REFERRAL TO OB/GYN    3. Healthcare maintenance-lab work ordered.  Virtual video visit to review the lab results in 2 weeks.  Patient encouraged to get COVID-19 vaccination and discussed the safety of it during pregnancy according to research now available.  Patient still declines.  Warned of risk of complications of pregnancy if she were to contract the virus.      - TSH; Future  - Comp Metabolic Panel; Future  - Lipid Profile; Future  - CBC WITH DIFFERENTIAL; Future  - VITAMIN D,25 HYDROXY; Future  - HEMOGLOBIN A1C; Future    4. IFG (impaired fasting glucose)  Need to rule this out due to impending pregnancy  - HEMOGLOBIN A1C; Future    5. Vitamin D deficiency  Taking supplements.  Check level  - VITAMIN D,25 HYDROXY; Future    6. Chronic hepatitis C without hepatic coma (HCC)      Patient has low viral load on testing 2 and half years ago.  Needs retesting and should consider referral to GI for treatment, patient declines at this time.  She will discuss further with her GYN doctor.  - HCV RNA QUANT, PCR; Future

## 2021-06-28 LAB
25(OH)D3+25(OH)D2 SERPL-MCNC: 31.4 NG/ML (ref 30–100)
ALBUMIN SERPL-MCNC: 4.3 G/DL (ref 3.8–4.8)
ALBUMIN/GLOB SERPL: 1.5 {RATIO} (ref 1.2–2.2)
ALP SERPL-CCNC: 69 IU/L (ref 48–121)
ALT SERPL-CCNC: 10 IU/L (ref 0–32)
AST SERPL-CCNC: 13 IU/L (ref 0–40)
BASOPHILS # BLD AUTO: 0 X10E3/UL (ref 0–0.2)
BASOPHILS NFR BLD AUTO: 0 %
BILIRUB SERPL-MCNC: 0.8 MG/DL (ref 0–1.2)
BUN SERPL-MCNC: 8 MG/DL (ref 6–20)
BUN/CREAT SERPL: 13 (ref 9–23)
CALCIUM SERPL-MCNC: 9.5 MG/DL (ref 8.7–10.2)
CHLORIDE SERPL-SCNC: 105 MMOL/L (ref 96–106)
CHOLEST SERPL-MCNC: 117 MG/DL (ref 100–199)
CO2 SERPL-SCNC: 19 MMOL/L (ref 20–29)
CREAT SERPL-MCNC: 0.64 MG/DL (ref 0.57–1)
EOSINOPHIL # BLD AUTO: 0 X10E3/UL (ref 0–0.4)
EOSINOPHIL NFR BLD AUTO: 0 %
ERYTHROCYTE [DISTWIDTH] IN BLOOD BY AUTOMATED COUNT: 12.3 % (ref 11.7–15.4)
GLOBULIN SER CALC-MCNC: 2.9 G/DL (ref 1.5–4.5)
GLUCOSE SERPL-MCNC: 99 MG/DL (ref 65–99)
HBA1C MFR BLD: 5.4 % (ref 4.8–5.6)
HCT VFR BLD AUTO: 43 % (ref 34–46.6)
HCV RNA SERPL NAA+PROBE-ACNC: NORMAL IU/ML
HCV RNA SERPL NAA+PROBE-LOG IU: 6.09 LOG10 IU/ML
HDLC SERPL-MCNC: 48 MG/DL
HGB BLD-MCNC: 14.1 G/DL (ref 11.1–15.9)
IMM GRANULOCYTES # BLD AUTO: 0 X10E3/UL (ref 0–0.1)
IMM GRANULOCYTES NFR BLD AUTO: 0 %
IMMATURE CELLS  115398: NORMAL
LABORATORY COMMENT REPORT: NORMAL
LDLC SERPL CALC-MCNC: 58 MG/DL (ref 0–99)
LYMPHOCYTES # BLD AUTO: 1 X10E3/UL (ref 0.7–3.1)
LYMPHOCYTES NFR BLD AUTO: 13 %
MCH RBC QN AUTO: 30.5 PG (ref 26.6–33)
MCHC RBC AUTO-ENTMCNC: 32.8 G/DL (ref 31.5–35.7)
MCV RBC AUTO: 93 FL (ref 79–97)
MONOCYTES # BLD AUTO: 0.6 X10E3/UL (ref 0.1–0.9)
MONOCYTES NFR BLD AUTO: 8 %
MORPHOLOGY BLD-IMP: NORMAL
NEUTROPHILS # BLD AUTO: 5.9 X10E3/UL (ref 1.4–7)
NEUTROPHILS NFR BLD AUTO: 79 %
NRBC BLD AUTO-RTO: NORMAL %
PLATELET # BLD AUTO: 276 X10E3/UL (ref 150–450)
POTASSIUM SERPL-SCNC: 4.4 MMOL/L (ref 3.5–5.2)
PROT SERPL-MCNC: 7.2 G/DL (ref 6–8.5)
RBC # BLD AUTO: 4.63 X10E6/UL (ref 3.77–5.28)
SODIUM SERPL-SCNC: 139 MMOL/L (ref 134–144)
TEST INFO 550307: NORMAL
TRIGL SERPL-MCNC: 44 MG/DL (ref 0–149)
TSH SERPL DL<=0.005 MIU/L-ACNC: 0.17 UIU/ML (ref 0.45–4.5)
VLDLC SERPL CALC-MCNC: 11 MG/DL (ref 5–40)
WBC # BLD AUTO: 7.6 X10E3/UL (ref 3.4–10.8)

## 2021-07-07 ENCOUNTER — TELEPHONE (OUTPATIENT)
Dept: MEDICAL GROUP | Age: 36
End: 2021-07-07

## 2021-07-07 NOTE — TELEPHONE ENCOUNTER
ESTABLISHED PATIENT PRE-VISIT PLANNING     Patient was NOT contacted to complete PVP.     Note: Patient will not be contacted if there is no indication to call.     1.  Reviewed notes from the last few office visits within the medical group: Yes    2.  If any orders were placed at last visit or intended to be done for this visit (i.e. 6 mos follow-up), do we have Results/Consult Notes?         •  Labs - Labs ordered, completed on 6/23/2021 and results are in chart.  Note: If patient appointment is for lab review and patient did not complete labs, check with provider if OK to reschedule patient until labs completed.       •  Imaging - Imaging was not ordered at last office visit.       •  Referrals - Referral ordered, patient has NOT been seen.    3. Is this appointment scheduled as a Hospital Follow-Up? No    4.  Immunizations were updated in Epic using Reconcile Outside Information activity? No    5.  Patient is due for the following Health Maintenance Topics:   Health Maintenance Due   Topic Date Due   • COVID-19 Vaccine (1) Never done   • IMM HEP B VACCINE (1 of 3 - Risk 3-dose series) Never done   • PAP SMEAR  06/12/2021       - Patient plans to schedule appointment for Pap.    6.  AHA (Pulse8) form printed for Provider? N/A

## 2021-07-08 ENCOUNTER — TELEMEDICINE (OUTPATIENT)
Dept: MEDICAL GROUP | Age: 36
End: 2021-07-08
Payer: COMMERCIAL

## 2021-07-08 VITALS
HEART RATE: 77 BPM | BODY MASS INDEX: 21.98 KG/M2 | WEIGHT: 145 LBS | HEIGHT: 68 IN | RESPIRATION RATE: 12 BRPM | TEMPERATURE: 98.6 F

## 2021-07-08 DIAGNOSIS — B18.2 CHRONIC HEPATITIS C WITHOUT HEPATIC COMA (HCC): ICD-10-CM

## 2021-07-08 DIAGNOSIS — Z23 NEED FOR VACCINATION: ICD-10-CM

## 2021-07-08 DIAGNOSIS — Z3A.01 LESS THAN 8 WEEKS GESTATION OF PREGNANCY: ICD-10-CM

## 2021-07-08 DIAGNOSIS — R79.89 LOW TSH LEVEL: ICD-10-CM

## 2021-07-08 PROCEDURE — 99214 OFFICE O/P EST MOD 30 MIN: CPT | Mod: 95 | Performed by: INTERNAL MEDICINE

## 2021-07-08 ASSESSMENT — FIBROSIS 4 INDEX: FIB4 SCORE: 0.55

## 2021-07-08 NOTE — PROGRESS NOTES
Routing to Bindu Leiva LCSW who Pt has appt with today for FYI.   Virtual Visit: Established Patient   This visit was conducted via Zoom   using secure and encrypted videoconferencing technology. The patient was in a private location in the state of [place].    The patient's identity was confirmed and verbal consent was obtained for this virtual visit.    Subjective:   CC:   Chief Complaint   Patient presents with   • Lab Results       Carmen Brown is a 36 y.o. female presenting for evaluation and management of:         ROS    Denies any recent fevers or chills. No nausea or vomiting. No chest pains or shortness of breath.     No Known Allergies    Current medicines (including changes today)  Current Outpatient Medications   Medication Sig Dispense Refill   • Prenatal MV-Min-Fe Fum-FA-DHA (PRENATAL 1 PO) Take  by mouth.     • Probiotic Product (PROBIOTIC ADVANCED PO) Take  by mouth.     • Ascorbic Acid (VITAMIN C) 1000 MG Tab Take  by mouth.     • Omega-3 Fatty Acids (FISH OIL) 1000 MG Cap capsule Take 1,000 mg by mouth 3 times a day with meals.       No current facility-administered medications for this visit.       Patient Active Problem List    Diagnosis Date Noted   • Less than 8 weeks gestation of pregnancy 06/24/2021   • Vitreous floaters of both eyes 12/05/2016   • Need for vaccination- covid 19 and hep b, and Tdap 12/05/2016   • Herpes genitalis in women 08/02/2011   • Chronic hepatitis C without hepatic coma (HCC)-needs treatment-RNA by PCR = 1.22 million, June 2021        Family History   Problem Relation Age of Onset   • Lung Disease Maternal Aunt         smoker   • Lung Disease Mother         smoker   • Hyperlipidemia Mother    • Diabetes Sister         no meds   • Lung Disease Maternal Grandmother    • Cancer Paternal Grandmother         Breast       She  has a past medical history of Hepatitis C (Dx 2002) and Vitamin D deficiency (6/24/2021). She also has no past medical history of Tuberculosis.  She  has a past surgical history that includes primary c section  "(9/13/2011).       Objective:   Ht 1.715 m (5' 7.5\")   Wt 65.8 kg (145 lb)   BMI 22.38 kg/m²     Physical Exam:   Constitutional: Alert, no distress, well-groomed.  Skin: No rashes in visible areas.  Eye: Round. Conjunctiva clear, lids normal. No icterus.   ENMT: Lips pink without lesions, good dentition, moist mucous membranes. Phonation normal.  Neck: No masses, no thyromegaly. Moves freely without pain.  Respiratory: Unlabored respiratory effort, no cough or audible wheeze  Psych: Alert and oriented x3, normal affect and mood.       Assessment and Plan:   The following treatment plan was discussed:     1. Less than 8 weeks gestation of pregnancy-asked to come firm by home urine pregnancy test.  Needs serum early pregnancy test.  Unfortunately was not done by the lab.  Reordered today.  Call results.    2. Need for vaccination- TDAP, COVID 19-patient will discuss with her OB/GYN.  - Tdap Vaccine =>8YO IM    3. Chronic hepatitis C without hepatic coma (HCC)-still has a significant viral load over 1 million by PCR RNA.  We will need to hold off until pregnancy is completed and no longer nursing before considering antiviral treatment with Harvoni or similar med  - REFERRAL TO GASTROENTEROLOGY    4. Low TSH level-probably due to pregnancy but must rule out early onset hyperthyroidism (Graves' disease)   - FREE THYROXINE; Future  - TSH; Future    Other orders  - Prenatal MV-Min-Fe Fum-FA-DHA (PRENATAL 1 PO); Take  by mouth.        Follow-up: No follow-ups on file.         "

## 2021-07-10 LAB
B-HCG SERPL QL: POSITIVE MIU/ML
T4 FREE SERPL-MCNC: 1.13 NG/DL (ref 0.82–1.77)
TSH SERPL DL<=0.005 MIU/L-ACNC: 0.38 UIU/ML (ref 0.45–4.5)

## 2021-07-22 LAB
ABO GROUP BLD: NORMAL
BLD GP AB SCN SERPL QL: NORMAL
HBV SURFACE AG SERPL QL IA: NEGATIVE
HIV 1+2 AB+HIV1 P24 AG SERPL QL IA: NORMAL
RH BLD: NORMAL
RUBV IGG SERPL IA-ACNC: NORMAL
TREPONEMA PALLIDUM IGG+IGM AB [PRESENCE] IN SERUM OR PLASMA BY IMMUNOASSAY: NORMAL

## 2021-08-25 ENCOUNTER — HOSPITAL ENCOUNTER (OUTPATIENT)
Dept: RADIOLOGY | Facility: MEDICAL CENTER | Age: 36
End: 2021-08-25
Attending: OBSTETRICS & GYNECOLOGY
Payer: COMMERCIAL

## 2021-08-25 DIAGNOSIS — Z3A.16 16 WEEKS GESTATION OF PREGNANCY: ICD-10-CM

## 2021-08-25 DIAGNOSIS — R22.32 MASS OF LEFT AXILLA: ICD-10-CM

## 2021-08-25 PROCEDURE — 76642 ULTRASOUND BREAST LIMITED: CPT | Mod: LT

## 2022-01-21 ENCOUNTER — HOSPITAL ENCOUNTER (INPATIENT)
Facility: MEDICAL CENTER | Age: 37
LOS: 2 days | End: 2022-01-23
Attending: OBSTETRICS & GYNECOLOGY | Admitting: OBSTETRICS & GYNECOLOGY
Payer: COMMERCIAL

## 2022-01-21 ENCOUNTER — ANESTHESIA (OUTPATIENT)
Dept: ANESTHESIOLOGY | Facility: MEDICAL CENTER | Age: 37
End: 2022-01-21
Payer: COMMERCIAL

## 2022-01-21 ENCOUNTER — ANESTHESIA EVENT (OUTPATIENT)
Dept: ANESTHESIOLOGY | Facility: MEDICAL CENTER | Age: 37
End: 2022-01-21
Payer: COMMERCIAL

## 2022-01-21 DIAGNOSIS — G89.18 POSTOPERATIVE PAIN: ICD-10-CM

## 2022-01-21 LAB
BASOPHILS # BLD AUTO: 0.2 % (ref 0–1.8)
BASOPHILS # BLD: 0.02 K/UL (ref 0–0.12)
EOSINOPHIL # BLD AUTO: 0.06 K/UL (ref 0–0.51)
EOSINOPHIL NFR BLD: 0.6 % (ref 0–6.9)
ERYTHROCYTE [DISTWIDTH] IN BLOOD BY AUTOMATED COUNT: 45.5 FL (ref 35.9–50)
HCT VFR BLD AUTO: 37.8 % (ref 37–47)
HGB BLD-MCNC: 12.9 G/DL (ref 12–16)
HOLDING TUBE BB 8507: NORMAL
IMM GRANULOCYTES # BLD AUTO: 0.05 K/UL (ref 0–0.11)
IMM GRANULOCYTES NFR BLD AUTO: 0.5 % (ref 0–0.9)
LYMPHOCYTES # BLD AUTO: 1.8 K/UL (ref 1–4.8)
LYMPHOCYTES NFR BLD: 17.9 % (ref 22–41)
MCH RBC QN AUTO: 31.5 PG (ref 27–33)
MCHC RBC AUTO-ENTMCNC: 34.1 G/DL (ref 33.6–35)
MCV RBC AUTO: 92.4 FL (ref 81.4–97.8)
MONOCYTES # BLD AUTO: 0.93 K/UL (ref 0–0.85)
MONOCYTES NFR BLD AUTO: 9.3 % (ref 0–13.4)
NEUTROPHILS # BLD AUTO: 7.17 K/UL (ref 2–7.15)
NEUTROPHILS NFR BLD: 71.5 % (ref 44–72)
NRBC # BLD AUTO: 0 K/UL
NRBC BLD-RTO: 0 /100 WBC
PLATELET # BLD AUTO: 224 K/UL (ref 164–446)
PMV BLD AUTO: 9.2 FL (ref 9–12.9)
RBC # BLD AUTO: 4.09 M/UL (ref 4.2–5.4)
SARS-COV+SARS-COV-2 AG RESP QL IA.RAPID: NOTDETECTED
SPECIMEN SOURCE: NORMAL
WBC # BLD AUTO: 10 K/UL (ref 4.8–10.8)

## 2022-01-21 PROCEDURE — 85025 COMPLETE CBC W/AUTO DIFF WBC: CPT

## 2022-01-21 PROCEDURE — 700111 HCHG RX REV CODE 636 W/ 250 OVERRIDE (IP)

## 2022-01-21 PROCEDURE — 700105 HCHG RX REV CODE 258: Performed by: OBSTETRICS & GYNECOLOGY

## 2022-01-21 PROCEDURE — 700101 HCHG RX REV CODE 250: Performed by: ANESTHESIOLOGY

## 2022-01-21 PROCEDURE — 700111 HCHG RX REV CODE 636 W/ 250 OVERRIDE (IP): Performed by: OBSTETRICS & GYNECOLOGY

## 2022-01-21 PROCEDURE — 87426 SARSCOV CORONAVIRUS AG IA: CPT

## 2022-01-21 PROCEDURE — 700111 HCHG RX REV CODE 636 W/ 250 OVERRIDE (IP): Performed by: ANESTHESIOLOGY

## 2022-01-21 PROCEDURE — 770002 HCHG ROOM/CARE - OB PRIVATE (112)

## 2022-01-21 PROCEDURE — 700105 HCHG RX REV CODE 258: Performed by: ANESTHESIOLOGY

## 2022-01-21 PROCEDURE — 303615 HCHG EPIDURAL/SPINAL ANESTHESIA FOR LABOR

## 2022-01-21 PROCEDURE — 36415 COLL VENOUS BLD VENIPUNCTURE: CPT

## 2022-01-21 RX ORDER — ONDANSETRON 2 MG/ML
4 INJECTION INTRAMUSCULAR; INTRAVENOUS EVERY 6 HOURS PRN
Status: DISCONTINUED | OUTPATIENT
Start: 2022-01-21 | End: 2022-01-22 | Stop reason: HOSPADM

## 2022-01-21 RX ORDER — DEXTROSE, SODIUM CHLORIDE, SODIUM LACTATE, POTASSIUM CHLORIDE, AND CALCIUM CHLORIDE 5; .6; .31; .03; .02 G/100ML; G/100ML; G/100ML; G/100ML; G/100ML
INJECTION, SOLUTION INTRAVENOUS CONTINUOUS
Status: DISCONTINUED | OUTPATIENT
Start: 2022-01-22 | End: 2022-01-23 | Stop reason: HOSPADM

## 2022-01-21 RX ORDER — SODIUM CHLORIDE, SODIUM LACTATE, POTASSIUM CHLORIDE, CALCIUM CHLORIDE 600; 310; 30; 20 MG/100ML; MG/100ML; MG/100ML; MG/100ML
1000 INJECTION, SOLUTION INTRAVENOUS CONTINUOUS
Status: ACTIVE | OUTPATIENT
Start: 2022-01-21 | End: 2022-01-22

## 2022-01-21 RX ORDER — ONDANSETRON 4 MG/1
4 TABLET, ORALLY DISINTEGRATING ORAL EVERY 6 HOURS PRN
Status: DISCONTINUED | OUTPATIENT
Start: 2022-01-21 | End: 2022-01-22 | Stop reason: HOSPADM

## 2022-01-21 RX ORDER — SODIUM CHLORIDE, SODIUM LACTATE, POTASSIUM CHLORIDE, AND CALCIUM CHLORIDE .6; .31; .03; .02 G/100ML; G/100ML; G/100ML; G/100ML
250 INJECTION, SOLUTION INTRAVENOUS PRN
Status: DISCONTINUED | OUTPATIENT
Start: 2022-01-21 | End: 2022-01-22 | Stop reason: HOSPADM

## 2022-01-21 RX ORDER — LIDOCAINE HYDROCHLORIDE AND EPINEPHRINE 15; 5 MG/ML; UG/ML
INJECTION, SOLUTION EPIDURAL
Status: COMPLETED | OUTPATIENT
Start: 2022-01-21 | End: 2022-01-21

## 2022-01-21 RX ORDER — TERBUTALINE SULFATE 1 MG/ML
0.25 INJECTION, SOLUTION SUBCUTANEOUS
Status: COMPLETED | OUTPATIENT
Start: 2022-01-21 | End: 2022-01-21

## 2022-01-21 RX ORDER — SODIUM CHLORIDE, SODIUM LACTATE, POTASSIUM CHLORIDE, AND CALCIUM CHLORIDE .6; .31; .03; .02 G/100ML; G/100ML; G/100ML; G/100ML
1000 INJECTION, SOLUTION INTRAVENOUS
Status: COMPLETED | OUTPATIENT
Start: 2022-01-21 | End: 2022-01-21

## 2022-01-21 RX ORDER — OXYTOCIN 10 [USP'U]/ML
10 INJECTION, SOLUTION INTRAMUSCULAR; INTRAVENOUS
Status: DISCONTINUED | OUTPATIENT
Start: 2022-01-21 | End: 2022-01-22 | Stop reason: HOSPADM

## 2022-01-21 RX ORDER — ROPIVACAINE HYDROCHLORIDE 2 MG/ML
INJECTION, SOLUTION EPIDURAL; INFILTRATION; PERINEURAL
Status: COMPLETED
Start: 2022-01-21 | End: 2022-01-21

## 2022-01-21 RX ORDER — ROPIVACAINE HYDROCHLORIDE 2 MG/ML
INJECTION, SOLUTION EPIDURAL; INFILTRATION; PERINEURAL CONTINUOUS
Status: DISCONTINUED | OUTPATIENT
Start: 2022-01-21 | End: 2022-01-23 | Stop reason: HOSPADM

## 2022-01-21 RX ADMIN — LIDOCAINE HYDROCHLORIDE,EPINEPHRINE BITARTRATE 3 ML: 15; .005 INJECTION, SOLUTION EPIDURAL; INFILTRATION; INTRACAUDAL; PERINEURAL at 20:57

## 2022-01-21 RX ADMIN — BUPIVACAINE HYDROCHLORIDE 10 ML: 2.5 INJECTION, SOLUTION EPIDURAL; INFILTRATION; INTRACAUDAL; PERINEURAL at 20:57

## 2022-01-21 RX ADMIN — ONDANSETRON 4 MG: 2 INJECTION INTRAMUSCULAR; INTRAVENOUS at 19:36

## 2022-01-21 RX ADMIN — FENTANYL CITRATE 100 MCG: 50 INJECTION INTRAMUSCULAR; INTRAVENOUS at 18:47

## 2022-01-21 RX ADMIN — ROPIVACAINE HYDROCHLORIDE: 2 INJECTION, SOLUTION EPIDURAL; INFILTRATION; PERINEURAL at 21:03

## 2022-01-21 RX ADMIN — SODIUM CHLORIDE, POTASSIUM CHLORIDE, SODIUM LACTATE AND CALCIUM CHLORIDE 1000 ML: 600; 310; 30; 20 INJECTION, SOLUTION INTRAVENOUS at 14:00

## 2022-01-21 RX ADMIN — SODIUM CHLORIDE, POTASSIUM CHLORIDE, SODIUM LACTATE AND CALCIUM CHLORIDE 1000 ML: 600; 310; 30; 20 INJECTION, SOLUTION INTRAVENOUS at 20:45

## 2022-01-21 RX ADMIN — SODIUM CHLORIDE, POTASSIUM CHLORIDE, SODIUM LACTATE AND CALCIUM CHLORIDE 1000 ML: 600; 310; 30; 20 INJECTION, SOLUTION INTRAVENOUS at 18:40

## 2022-01-21 RX ADMIN — OXYTOCIN 1 MILLI-UNITS/MIN: 10 INJECTION, SOLUTION INTRAMUSCULAR; INTRAVENOUS at 17:45

## 2022-01-21 RX ADMIN — ROPIVACAINE HYDROCHLORIDE: 2 INJECTION, SOLUTION EPIDURAL; INFILTRATION at 21:03

## 2022-01-21 ASSESSMENT — PATIENT HEALTH QUESTIONNAIRE - PHQ9
1. LITTLE INTEREST OR PLEASURE IN DOING THINGS: NOT AT ALL
2. FEELING DOWN, DEPRESSED, IRRITABLE, OR HOPELESS: NOT AT ALL
SUM OF ALL RESPONSES TO PHQ9 QUESTIONS 1 AND 2: 0

## 2022-01-21 ASSESSMENT — LIFESTYLE VARIABLES: EVER_SMOKED: NEVER

## 2022-01-21 ASSESSMENT — FIBROSIS 4 INDEX: FIB4 SCORE: 0.57

## 2022-01-22 LAB
ALBUMIN SERPL BCP-MCNC: 2.9 G/DL (ref 3.2–4.9)
ALBUMIN/GLOB SERPL: 1.2 G/DL
ALP SERPL-CCNC: 140 U/L (ref 30–99)
ALT SERPL-CCNC: 14 U/L (ref 2–50)
ANION GAP SERPL CALC-SCNC: 10 MMOL/L (ref 7–16)
AST SERPL-CCNC: 31 U/L (ref 12–45)
BILIRUB SERPL-MCNC: 1 MG/DL (ref 0.1–1.5)
BUN SERPL-MCNC: 6 MG/DL (ref 8–22)
CALCIUM SERPL-MCNC: 8.6 MG/DL (ref 8.5–10.5)
CHLORIDE SERPL-SCNC: 107 MMOL/L (ref 96–112)
CO2 SERPL-SCNC: 21 MMOL/L (ref 20–33)
CREAT SERPL-MCNC: 0.77 MG/DL (ref 0.5–1.4)
ERYTHROCYTE [DISTWIDTH] IN BLOOD BY AUTOMATED COUNT: 46.6 FL (ref 35.9–50)
GLOBULIN SER CALC-MCNC: 2.4 G/DL (ref 1.9–3.5)
GLUCOSE SERPL-MCNC: 112 MG/DL (ref 65–99)
HCT VFR BLD AUTO: 35.7 % (ref 37–47)
HGB BLD-MCNC: 12.3 G/DL (ref 12–16)
MCH RBC QN AUTO: 32.4 PG (ref 27–33)
MCHC RBC AUTO-ENTMCNC: 34.5 G/DL (ref 33.6–35)
MCV RBC AUTO: 93.9 FL (ref 81.4–97.8)
PLATELET # BLD AUTO: 191 K/UL (ref 164–446)
PMV BLD AUTO: 9.1 FL (ref 9–12.9)
POTASSIUM SERPL-SCNC: 3.7 MMOL/L (ref 3.6–5.5)
PROT SERPL-MCNC: 5.3 G/DL (ref 6–8.2)
RBC # BLD AUTO: 3.8 M/UL (ref 4.2–5.4)
SODIUM SERPL-SCNC: 138 MMOL/L (ref 135–145)
WBC # BLD AUTO: 13.4 K/UL (ref 4.8–10.8)

## 2022-01-22 PROCEDURE — A9270 NON-COVERED ITEM OR SERVICE: HCPCS | Performed by: OBSTETRICS & GYNECOLOGY

## 2022-01-22 PROCEDURE — 59409 OBSTETRICAL CARE: CPT

## 2022-01-22 PROCEDURE — 0HQ9XZZ REPAIR PERINEUM SKIN, EXTERNAL APPROACH: ICD-10-PCS | Performed by: OBSTETRICS & GYNECOLOGY

## 2022-01-22 PROCEDURE — 700111 HCHG RX REV CODE 636 W/ 250 OVERRIDE (IP): Performed by: OBSTETRICS & GYNECOLOGY

## 2022-01-22 PROCEDURE — 304965 HCHG RECOVERY SERVICES

## 2022-01-22 PROCEDURE — 700105 HCHG RX REV CODE 258: Performed by: OBSTETRICS & GYNECOLOGY

## 2022-01-22 PROCEDURE — 36415 COLL VENOUS BLD VENIPUNCTURE: CPT

## 2022-01-22 PROCEDURE — 80053 COMPREHEN METABOLIC PANEL: CPT

## 2022-01-22 PROCEDURE — 770002 HCHG ROOM/CARE - OB PRIVATE (112)

## 2022-01-22 PROCEDURE — 0UQMXZZ REPAIR VULVA, EXTERNAL APPROACH: ICD-10-PCS | Performed by: OBSTETRICS & GYNECOLOGY

## 2022-01-22 PROCEDURE — 700102 HCHG RX REV CODE 250 W/ 637 OVERRIDE(OP): Performed by: OBSTETRICS & GYNECOLOGY

## 2022-01-22 PROCEDURE — 85027 COMPLETE CBC AUTOMATED: CPT

## 2022-01-22 RX ORDER — ACETAMINOPHEN 500 MG
1000 TABLET ORAL EVERY 6 HOURS PRN
Status: DISCONTINUED | OUTPATIENT
Start: 2022-01-22 | End: 2022-01-23 | Stop reason: HOSPADM

## 2022-01-22 RX ORDER — SODIUM CHLORIDE, SODIUM LACTATE, POTASSIUM CHLORIDE, CALCIUM CHLORIDE 600; 310; 30; 20 MG/100ML; MG/100ML; MG/100ML; MG/100ML
INJECTION, SOLUTION INTRAVENOUS PRN
Status: DISCONTINUED | OUTPATIENT
Start: 2022-01-22 | End: 2022-01-23 | Stop reason: HOSPADM

## 2022-01-22 RX ORDER — VITAMIN A ACETATE, BETA CAROTENE, ASCORBIC ACID, CHOLECALCIFEROL, .ALPHA.-TOCOPHEROL ACETATE, DL-, THIAMINE MONONITRATE, RIBOFLAVIN, NIACINAMIDE, PYRIDOXINE HYDROCHLORIDE, FOLIC ACID, CYANOCOBALAMIN, CALCIUM CARBONATE, FERROUS FUMARATE, ZINC OXIDE, CUPRIC OXIDE 3080; 12; 120; 400; 1; 1.84; 3; 20; 22; 920; 25; 200; 27; 10; 2 [IU]/1; UG/1; MG/1; [IU]/1; MG/1; MG/1; MG/1; MG/1; MG/1; [IU]/1; MG/1; MG/1; MG/1; MG/1; MG/1
1 TABLET, FILM COATED ORAL
Status: DISCONTINUED | OUTPATIENT
Start: 2022-01-22 | End: 2022-01-23 | Stop reason: HOSPADM

## 2022-01-22 RX ORDER — DOCUSATE SODIUM 100 MG/1
100 CAPSULE, LIQUID FILLED ORAL 2 TIMES DAILY PRN
Status: DISCONTINUED | OUTPATIENT
Start: 2022-01-22 | End: 2022-01-23 | Stop reason: HOSPADM

## 2022-01-22 RX ORDER — LIDOCAINE HYDROCHLORIDE 10 MG/ML
INJECTION, SOLUTION INFILTRATION; PERINEURAL
Status: ACTIVE
Start: 2022-01-22 | End: 2022-01-22

## 2022-01-22 RX ORDER — BISACODYL 10 MG
10 SUPPOSITORY, RECTAL RECTAL PRN
Status: DISCONTINUED | OUTPATIENT
Start: 2022-01-22 | End: 2022-01-23 | Stop reason: HOSPADM

## 2022-01-22 RX ORDER — MISOPROSTOL 200 UG/1
600 TABLET ORAL
Status: DISCONTINUED | OUTPATIENT
Start: 2022-01-22 | End: 2022-01-23 | Stop reason: HOSPADM

## 2022-01-22 RX ORDER — IBUPROFEN 800 MG/1
800 TABLET ORAL 3 TIMES DAILY PRN
Status: DISCONTINUED | OUTPATIENT
Start: 2022-01-22 | End: 2022-01-23 | Stop reason: HOSPADM

## 2022-01-22 RX ORDER — OXYCODONE HYDROCHLORIDE 5 MG/1
5 TABLET ORAL EVERY 4 HOURS PRN
Status: DISCONTINUED | OUTPATIENT
Start: 2022-01-22 | End: 2022-01-23 | Stop reason: HOSPADM

## 2022-01-22 RX ADMIN — PRENATAL WITH FERROUS FUM AND FOLIC ACID 1 TABLET: 3080; 920; 120; 400; 22; 1.84; 3; 20; 10; 1; 12; 200; 27; 25; 2 TABLET ORAL at 10:27

## 2022-01-22 RX ADMIN — ACETAMINOPHEN 1000 MG: 500 TABLET ORAL at 10:26

## 2022-01-22 RX ADMIN — IBUPROFEN 800 MG: 800 TABLET, FILM COATED ORAL at 10:26

## 2022-01-22 RX ADMIN — OXYTOCIN 2000 ML/HR: 10 INJECTION, SOLUTION INTRAMUSCULAR; INTRAVENOUS at 05:28

## 2022-01-22 RX ADMIN — Medication 125 ML/HR: at 06:25

## 2022-01-22 RX ADMIN — Medication 125 ML/HR: at 06:30

## 2022-01-22 RX ADMIN — SODIUM CHLORIDE, SODIUM LACTATE, POTASSIUM CHLORIDE, CALCIUM CHLORIDE AND DEXTROSE MONOHYDRATE: 5; 600; 310; 30; 20 INJECTION, SOLUTION INTRAVENOUS at 00:41

## 2022-01-22 RX ADMIN — IBUPROFEN 800 MG: 800 TABLET, FILM COATED ORAL at 18:14

## 2022-01-22 ASSESSMENT — EDINBURGH POSTNATAL DEPRESSION SCALE (EPDS)
THINGS HAVE BEEN GETTING ON TOP OF ME: NO, I HAVE BEEN COPING AS WELL AS EVER
I HAVE BEEN ANXIOUS OR WORRIED FOR NO GOOD REASON: NO, NOT AT ALL
I HAVE BLAMED MYSELF UNNECESSARILY WHEN THINGS WENT WRONG: NOT VERY OFTEN
I HAVE FELT SAD OR MISERABLE: NO, NOT AT ALL
I HAVE BEEN SO UNHAPPY THAT I HAVE BEEN CRYING: NO, NEVER
I HAVE BEEN SO UNHAPPY THAT I HAVE HAD DIFFICULTY SLEEPING: NOT AT ALL
THE THOUGHT OF HARMING MYSELF HAS OCCURRED TO ME: NEVER
I HAVE LOOKED FORWARD WITH ENJOYMENT TO THINGS: AS MUCH AS I EVER DID
I HAVE FELT SCARED OR PANICKY FOR NO GOOD REASON: NO, NOT AT ALL
I HAVE BEEN ABLE TO LAUGH AND SEE THE FUNNY SIDE OF THINGS: AS MUCH AS I ALWAYS COULD

## 2022-01-22 ASSESSMENT — PAIN DESCRIPTION - PAIN TYPE
TYPE: ACUTE PAIN
TYPE: ACUTE PAIN

## 2022-01-22 ASSESSMENT — PAIN SCALES - GENERAL: PAIN_LEVEL: 0

## 2022-01-22 NOTE — H&P
"  Labor and Delivery History and Physical    Date of Admission: 2022      ID: 37 y.o.  with IUP at 39w2d     Primary OB: Yvonne Kahn M.D.    Attending OB: Ganesh Mars M.D.    CC: Leaking of fluid    HPI: Carmen Brown is a 37 y.o.  at 39w2d by 12 week ultrasound, who presents with leaking of fluid.  She noted a large amount of leaking of fluid at ~11:30am.  She noted only intermittent contractions since that time.  She denies vaginal bleeding.  She endorses good FM. No other c/o today.    She denies lesions or prodromal sx of HSV.  Current pregnancy has been complicated by active Hepatitis C and AMA.      ROS: 10 systems reviewed and negative except as noted above.    Obstetric History    - , LTCS, 41wk, M, 6 lb 10 oz.  Failed IOL for postdates, and NR-FHT.  G2 - Current, as noted in HPI      Past Medical History  Surgical History   In recovery from opiates, prior IVDU.   Hepatitis C  section      Gynecologic History  Social History   Regular menses prior to pregnancy  Denies Hx of abnormal pap smears.  + Hx of STIs: HSV Tobacco: denies  EtOH: denies  Street Drugs: Prior IVDU, now clean 11y  Works in medical billing      Medications  Allergies   No current facility-administered medications on file prior to encounter.     Current Outpatient Medications on File Prior to Encounter   Medication Sig Dispense Refill   • Prenatal MV-Min-Fe Fum-FA-DHA (PRENATAL 1 PO) Take  by mouth.     • Probiotic Product (PROBIOTIC ADVANCED PO) Take  by mouth.     • Ascorbic Acid (VITAMIN C) 1000 MG Tab Take  by mouth.     • Omega-3 Fatty Acids (FISH OIL) 1000 MG Cap capsule Take 1,000 mg by mouth 3 times a day with meals.      No Known Allergies     Family Medical History   Diabetes (Mother, sister), Breast CA (MGM)       O: /64   Pulse 100   Temp 36.6 °C (97.8 °F) (Temporal)   Ht 1.715 m (5' 7.5\")   Wt 81.2 kg (179 lb)   BMI 27.62 kg/m²       Gen: NAD, AAO  Resp: " unlabored  Abd: Gravid, NTTP,Cephalic by [place], No rebound or guarding  Ext: NTTP, no edema, 2+DPP  Pelvic: SVE 1/100/-2    FHT:  115/mod juan/+accels, -decels  [place]: 2-6min    Labs:   Lab Results   Component Value Date/Time    WBC 10.0 2022 02:46 PM    RBC 4.09 (L) 2022 02:46 PM    HEMOGLOBIN 12.9 2022 02:46 PM    HEMATOCRIT 37.8 2022 02:46 PM    MCV 92.4 2022 02:46 PM    RDW 45.5 2022 02:46 PM    PLATELETCT 224 2022 02:46 PM       Prenatal labs:   Lab  Result    Rh  AB+    Antibody screen  negative    Rubella  immune    HIV  Non-reactive    RPR  Non-reactive    HBsAg  negative    HCV Ab  POSITIVE, most recent viral load 1.2M.      Urine Culture  no growth    Gonorrhea/Chlamydia/Trich  neg/neg/neg    Aneuploidy screening  NIPT low risk    1h Glucose  118, wnl    GBS  negative       A/P: Carmen Brown is a  at 39w2d by 12wk U/S who presents with PROM and desire for TOLAC.  Active Hepatitis C.  Hx of genital HSV.  AVSS.  Cat I FHT.  *Admit to L&D  *IV, CBC, T&S on hold  *Prelabor rupture of membranes/TOLAC: The patient was advised on the risks, benefits, and alternatives to trial of labor versus repeat  section.  She was advised that because of the diagnosis of PROM and the lack of labor that the use of oxytocin would needed if she were to pursue trial of labor versus moving forward with .  This would increase her potential risk for uterine rupture.  She is aware of this risk and had plans with her primary obstetrician for an induction of labor on the  of this month.  She was advised on the risks of both herself and baby regarding uterine rupture and makes the informed decision to move forward with TOLAC.   -Oxytocin per protocol   -Minimize exams as possible  *FWB: Reactive, Cat I FHT.     CEFM.  *Hx of HSV: no prodromal sx or lesions per pt report and on exam   - Pt aware of risks  *Active hepatitis C: The patient is aware of the risk of  transmission of hepatitis C.  This may occur despite best practices.  She was also made aware of some studies showing a decreased rate of transmission with  delivery with high viral loads, however Dr. Guzmán stated that she would not be required to deliver by  as her viral load did decrease.   - follow-up will be needed   -Avoid FSE and operative delivery as possible  *Pain: epidural available as needed for phase of labor.  *Global: Rh+, RubImm, GBS neg.    - Clears    Ganesh Mars MD, MS,  2022, 5:48 PM

## 2022-01-22 NOTE — ANESTHESIA TIME REPORT
Anesthesia Start and Stop Event Times     Date Time Event    1/21/2022 2020 Ready for Procedure     2048 Anesthesia Start    1/22/2022 0523 Anesthesia Stop        Responsible Staff  01/21/22 to 01/22/22    Name Role Begin End    Kenia Nevarez M.D. Anesth 2048 0523        Preop Diagnosis (Free Text):  Pre-op Diagnosis             Preop Diagnosis (Codes):    Premium Reason  A. 3PM - 7AM    Comments:

## 2022-01-22 NOTE — PROGRESS NOTES
"  Labor and Delivery Progress Note    ID/CC: 37 y.o. is a  at 39w2d, PROM, TOLAC    S: Comfy with epidural    O: /51   Pulse 76   Temp 36.5 °C (97.7 °F) (Temporal)   Ht 1.715 m (5' 7.5\")   Wt 81.2 kg (179 lb)   SpO2 97%   BMI 27.62 kg/m²    Gen: NAD, AAO  FHT: 130/mod juan/+accels, prolonged deceleration associated with   [place]: q4min  SVE: 6/100/0 per RN exam    A/P: Carmen Brown is a 37 y.o.  at 39w2d, PROM, TOLAC.  AVSS.  Cat II now Cat I FHT.  *TOLAC/PROM: Making cervical change.  Notable for VTX descent.  Plan to restart oxytocin after 30min of recovery from decel.     - Restart oxytocin if Cat I.    *FWB: Returned to reactive, Cat I FHT, now after prolonged deceleration.  Likely related to hypotension and no evidence for uterine rupture.  Can continue IOL.     - CEFM.  *Active hepatitis C: The patient is aware of the risk of transmission of hepatitis C.  This may occur despite best practices.                - follow-up will be needed              -Avoid FSE and operative delivery as possible  *Pain: epidural providing good relief  *Rh+/RubImm/GBSneg   - Clearestella Mars M.D., 2022, 11:18 PM    "

## 2022-01-22 NOTE — ANESTHESIA POSTPROCEDURE EVALUATION
Patient: Carmen Brown    Procedure Summary     Date: 01/21/22 Room / Location:     Anesthesia Start: 2048 Anesthesia Stop: 01/22/22 0523    Procedure: Labor Epidural Diagnosis:     Scheduled Providers:  Responsible Provider: Kenia Nevarez M.D.    Anesthesia Type: epidural ASA Status: 2          Final Anesthesia Type: epidural  Last vitals  BP   Blood Pressure: 114/64    Temp   36.8 °C (98.2 °F)    Pulse   92   Resp        SpO2   97 %      Anesthesia Post Evaluation    Patient location during evaluation: bedside  Patient participation: complete - patient participated  Level of consciousness: awake and alert  Pain score: 0    Airway patency: patent  Anesthetic complications: no  Cardiovascular status: adequate  Respiratory status: acceptable  Hydration status: acceptable    PONV: none          No complications documented.

## 2022-01-22 NOTE — PROGRESS NOTES
Admitted pt form L&D to S335 w/ male infant, FOB and LAURE Son via wheelchair. A/O, not in any distress. Transferred from wheelchair to bed w/out difficulty. Fundus firm, lochia light. Oriented to room, bed call sytem. Instructed on fall precautions and safety protocols. Infant bundled in bassinet and in no distress. Bands verified.

## 2022-01-22 NOTE — PROGRESS NOTES
1330-Pt presents to labor and delivery with complaints of SROM at 1130. Pt grossly ruptured. SVE 1/75/-2. Applied EFM and toco monitors, vital signs done.  Repositioned to left side.  Fluids given.  Encouraged to drink.  Dr. Mars called, report given, orders received.  Will continue to monitor status.  1400-Report received to admit pt for labor. IV started and labs sent. Admission profile complete.  1720-Dr. Mars at bedside. SVE 1/100/-2. IUPC placed. Orders received for pitocin.  1745-Pitocin started per MD order.   1830-Bolus started for epidural. Consents signed.  1900-Report to Iza KELSEY.

## 2022-01-22 NOTE — ANESTHESIA PROCEDURE NOTES
Epidural Block    Date/Time: 1/21/2022 8:57 PM  Performed by: Kenia Nevarez M.D.  Authorized by: Kenia Nevarez M.D.     Patient Location:  OB  Start Time:  1/21/2022 8:57 PM  Reason for Block: labor analgesia    patient identified, IV checked, site marked, risks and benefits discussed, surgical consent, monitors and equipment checked, pre-op evaluation and timeout performed    Patient Position:  Sitting  Prep: ChloraPrep, patient draped and sterile technique    Monitoring:  Blood pressure, continuous pulse oximetry and heart rate  Approach:  Midline  Location:  L3-L4  Injection Technique:  DOMINIK saline  Skin infiltration:  Lidocaine  Strength:  1%  Dose:  3ml  Needle Type:  Tuohy  Needle Gauge:  17 G  Needle Length:  3.5 in  Loss of resistance::  4  Catheter Size:  19 G  Catheter at Skin Depth:  9  Test Dose Result:  Negative

## 2022-01-22 NOTE — ANESTHESIA PREPROCEDURE EVALUATION
Date: 01/21/22  Procedure: Labor Epidural         Relevant Problems      (positive) Chronic hepatitis C without hepatic coma (HCC)-needs treatment-RNA by PCR = 1.22 million, June 2021       Physical Exam    Airway   Mallampati: I  TM distance: >3 FB  Neck ROM: full       Cardiovascular - normal exam     Dental - normal exam           Pulmonary   Breath sounds clear to auscultation     Abdominal    Neurological - normal exam                 Anesthesia Plan    ASA 2       Plan - epidural   Neuraxial block will be labor analgesia                  Pertinent diagnostic labs and testing reviewed    Informed Consent:    Anesthetic plan and risks discussed with patient.

## 2022-01-22 NOTE — PROGRESS NOTES
: Received report from LAURE [facility]. POC discussed.      : Dr. Holt at bedside to place epidural.    : Epidural in place, tolerated well.    : States that she feels comfortable and has no pain at this time.    : Pt repositioned for fetal deceleration. IV bolus started.    : Pitocin turned off. Pt repositioned. Dr. Holt notified of pt VS.     : Dr. Mars called, will be at bedside to evaluate pt in person. SVE, /0 exam by VAN Lopez RN.    : Dr. Mars at bedside. Pt updated. No questions at this time.    : Dr. Holt updated.    : Dr. Holt updated.    : Received orders from Dr. Mars to restart pitocin.    0217: Dr. Mars updated. Pt feels warm to touch, remains afebrile at this time. FHR baseline  Gradually increasing. RN will recheck pt cervix in 2 hours.    0321: Dr. Mars updated. POC discussed.    0330: Dr. Holt updated, order received. Per MD RN ok to decrease epidural rate to 8.    0408: Started pushing, RN continually at bedside. Coaching patient.    0457: Dr. Mars called for delivery.    0514: Pushing resumed, MD in room for delivery.     0523:  of viable male infant. 8/9 APGARS, 3VC.     0528:  of intact placenta.  per MD.    0640: Ambulated to bathroom with SBA of RN, tolerated well. Able to void at this time.

## 2022-01-22 NOTE — L&D DELIVERY NOTE
SPONTANEOUS VAGINAL DELIVERY PROCEDURE NOTE    DATE OF DELIVERY: 2022       PRIMARY OBSTETRICIAN: Yvonne Kahn M.D.    DELIVERING OBSTETRICIAN: Ganesh Mars MD    PROCEDURE: Normal spontaneous vaginal delivery    PREPROCEDURE DIAGNOSES:  1.  Intrauterine pregnancy at 39w3d   2.  History of prior  section, desire   3.  History of prior IV drug use, clean for 11 years  4.  Active maternal hepatitis C  5.  Advanced maternal age  6.  Trial of labor after  section      POSTPROCEDURE DIAGNOSES:  1.  Intrauterine pregnancy at 39w3d   2.  History of prior  section, desire   3.  History of prior IV drug use, clean for 11 years  4.  Active maternal hepatitis C  5.  Advanced maternal age  6.  Successful trial of labor after  section  7.  Postpartum status post vaginal birth after .    ANESTHESIA: epidural    ANESTHESIOLOGIST: Kenia Nevarez MD    FINDINGS:  1.  Viable male infant in OA position delivered at 5:23 AM  on 2022.   2.  Birth Weight: pending  3.  APGARs 8 at 1 minute, 9 at 5 minutes.  4.  Intact, normal-appearing placenta, three-vessel cord, central insertion.  5.  Left 2cm labial laceration and bilateral sub-centimeter periurethral lacerations.  6.  Clear amniotic fluid.    ESTIMATED BLOOD LOSS: 250mL    NARRATIVE:   This 37-year-old -0-0-1 with intrauterine pregnancy at 39w3d gestational age presented the evening prior with complaints of leaking of fluid.  She strongly desired trial of labor after .  She had discussed being induced the following week with her primary obstetrician (Dr. Kahn) and is aware of the risks of trial of labor after  and the increased risk of uterine rupture with the use of oxytocin.  The patient was found to have a 1 cm dilated cervix, however was 90% effaced at this time and beginning to have contractions.  After the risks, benefits and alternatives were discussed at length the patient wished to go  forward with trial of labor after .  She signed a consent form affirming her decision and our discussion.  She was placed on oxytocin and increase slowly.  She did have an episode of a prolonged deceleration associated with hypotension approximately an hour after her epidural was placed.  She had another shorter but still prolonged deceleration a few hours later.  The clinical assessment was that this was not related to uterine rupture given the absence of bleeding lack of station or change in maternal discomfort.  She proceeded to continue to have category 1 fetal heart tones before, between, and after these episodes.  She progressed in labor to complete dilation  She pushed for approximately 15 minutes before delivery of the fetal head, which occurred atraumatically. It was guided out gently without traction.  The shoulders and the rest of the baby delivered atraumatically, immediately thereafter.  The infant was warmed and dried by the awaiting baby nurse.  The nose and mouth were suctioned with the bulb suction.  The infant was moving all extremities equally.   The cord was doubly clamped and cut and the placenta delivered quickly there after during active management of the third stage of labor with fundal massage, gentle cord traction and application of oxytocin at postpartum dosing.   A survey of the patient's perineum, vulva and vagina revealed a 2 cm left labial laceration and bilateral subcentimeter periurethral lacerations.  These were all repaired with interrupted stitches of 3-0 Vicryl Rapide. At the end of the repair the edges were hemostatic and well reapproximated.  The patient tolerated the procedure well.  There were no complications.  Sponge and needle counts are correct at the end of the procedure.  The patient and her infant remained in her birthing room before later transfer to maternity.  Dr. Mars was present throughout and performed the entire procedure.    COMPLICATIONS:  none    CONDITION: good    DISPOSITION: Labor room then Maternity         Ganesh Mars MD, MS, FACOG,  1/22/2022

## 2022-01-23 VITALS
DIASTOLIC BLOOD PRESSURE: 70 MMHG | RESPIRATION RATE: 16 BRPM | SYSTOLIC BLOOD PRESSURE: 115 MMHG | HEART RATE: 82 BPM | WEIGHT: 179 LBS | BODY MASS INDEX: 27.13 KG/M2 | OXYGEN SATURATION: 95 % | HEIGHT: 68 IN | TEMPERATURE: 97.8 F

## 2022-01-23 PROCEDURE — A9270 NON-COVERED ITEM OR SERVICE: HCPCS | Performed by: OBSTETRICS & GYNECOLOGY

## 2022-01-23 PROCEDURE — 700102 HCHG RX REV CODE 250 W/ 637 OVERRIDE(OP): Performed by: OBSTETRICS & GYNECOLOGY

## 2022-01-23 RX ORDER — IBUPROFEN 800 MG/1
800 TABLET ORAL 3 TIMES DAILY PRN
Qty: 30 TABLET | Refills: 1 | Status: SHIPPED | OUTPATIENT
Start: 2022-01-23 | End: 2022-06-06

## 2022-01-23 RX ADMIN — IBUPROFEN 800 MG: 800 TABLET, FILM COATED ORAL at 01:54

## 2022-01-23 RX ADMIN — PRENATAL WITH FERROUS FUM AND FOLIC ACID 1 TABLET: 3080; 920; 120; 400; 22; 1.84; 3; 20; 10; 1; 12; 200; 27; 25; 2 TABLET ORAL at 08:34

## 2022-01-23 RX ADMIN — IBUPROFEN 800 MG: 800 TABLET, FILM COATED ORAL at 15:53

## 2022-01-23 RX ADMIN — ACETAMINOPHEN 1000 MG: 500 TABLET ORAL at 08:34

## 2022-01-23 ASSESSMENT — PAIN DESCRIPTION - PAIN TYPE: TYPE: SURGICAL PAIN

## 2022-01-23 NOTE — DISCHARGE SUMMARY
DATE OF ADMISSION:  2022   DATE OF DISCHARGE:  2022     This is a private patient of Dr. Kahn.     ADMITTING DIAGNOSES:  1.  Intrauterine pregnancy at 39 and 2/7th weeks.  2.  Spontaneous rupture of membranes.  3.  Prior  section, desires a trial of labor.  4.  Active hepatitis C infection.  5.  History of genital herpes.     DISCHARGE DIAGNOSES:  1.  Intrauterine pregnancy at 39 and 2/7th weeks.  2.  Spontaneous rupture of membranes.  3.  Prior  section, desires a trial of labor.  4.  Active hepatitis C infection.  5.  History of genital herpes.  6.  Status post normal spontaneous vaginal delivery (successful vaginal birth   after  section).     HOSPITAL COURSE:  The patient was admitted and underwent her vaginal delivery,   uncomplicated yesterday morning.  She is postpartum day #1 and stable and   desires to be discharged home.  Her postpartum hemoglobin is stable at 12.3.    She has a prescription written for Motrin.  She has been instructed to follow   up with Dr. Kahn in 6 weeks' time.        ______________________________  Corinne E. Capurro, MD        DD:  2022 06:52  DT:  2022 07:25    Job#:  [id number]

## 2022-01-23 NOTE — CARE PLAN
Problem: Pain - Standard  Goal: Alleviation of pain or a reduction in pain to the patient’s comfort goal  Outcome: Met  Note: Pt states pain is well managed on oral tylenol.      Problem: Psychosocial - Postpartum  Goal: Patient will verbalize and demonstrate effective bonding and parenting behavior  Outcome: Met  Note: Pt very attentive to . Frequent skin to skin contact.    The patient is Stable - Low risk of patient condition declining or worsening    Shift Goals  Clinical Goals: discharge; pain management  Patient Goals: discharge; baby circumcised  Family Goals: discharge    Progress made toward(s) clinical / shift goals:   met    Patient is not progressing towards the following goals:

## 2022-01-23 NOTE — DISCHARGE PLANNING
Discharge Planning Assessment Post Partum    Reason for Referral:  Consult-history of IV drug use-sober for 10 years  Address: [facility] [place] [address]  Phone: [phone]  Type of Living Situation: living with FOB  Mom Diagnosis: Pregnancy  Baby Diagnosis: [place]-39.3 weeks  Primary Language: English    Father of the Baby: Adair Brown  Involved in baby’s care? Yes  Contact Information: [phone]    Prenatal Care: Yes  Mom's PCP: Dr. Caio Kaiser  PCP for new baby: KEO Celis    Support System: FOB  Coping/Bonding between mother & baby: Yes  Source of Feeding: breast feeding  Supplies for Infant: prepared for infant-denies any needs    Mom's Insurance: [place]  Baby Covered on Insurance:Yes  Mother Employed/School: [employer]  Other children in the home/names & ages: son-age 10 years    Financial Hardship/Income: denies  Mom's Mental status: alert and oriented  Services used prior to admit: None    CPS History: No  Psychiatric History: No  Domestic Violence History: No  Drug/ETOH History: past history-nothing current     Resources Provided: post partum support and counseling resources provided to mother  Referrals Made: None     Clearance for Discharge: Infant is cleared to discharge home with parents

## 2022-01-23 NOTE — CARE PLAN
The patient is Stable - Low risk of patient condition declining or worsening    Shift Goals  Clinical Goals: Pain management, normal lochia  Patient Goals: breastfeed and bond w/ baby  Family Goals: bond w/ baby    Progress made toward(s) clinical / shift goals:  Patient reports adequate pain management. Lochia is normal, expectations and normal changes reviewed.    Patient is not progressing towards the following goals:

## 2022-01-23 NOTE — DISCHARGE INSTRUCTIONS
PATIENT DISCHARGE EDUCATION INSTRUCTION SHEET  REASONS TO CALL YOUR OBSTETRICIAN  · Persistent fever, shaking, chills (Temperature higher than 100.4) may indicate you have an infection  · Heavy bleeding: soaking more than 1 pad per hour; Passing clots an egg-sized clot or bigger may mean you have an postpartum hemorrhage  · Foul odor from vagina or bad smelling or discolored discharge or blood  · Breast infection (Mastitis symptoms); breast pain, chills, fever, redness or red streaks, may feel flu like symptoms  · Urinary pain, burning or frequency  · Incision that is not healing, increased redness, swelling, tenderness or pain, or any pus from episiotomy or  site may mean you have an infection  · Redness, swelling, warmth, or painful to touch in the calf area of your leg may mean you have a blood clot  · Severe or intensified depression, thoughts or feelings of wanting to hurt yourself or someone else   · Pain in chest, obstructed breathing or shortness of breath (trouble catching your breath) may mean you are having a postpartum complication. Call your provider immediately   · Headache that does not get better, even after taking medicine, a bad headache with vision changes or pain in the upper right area of your belly may mean you have high blood pressure or post birth preeclampsia. Call your provider immediately    HAND WASHING  All family and friends should wash their hands:  · Before and after holding the baby  · Before feeding the baby  · After using the restroom or changing the baby's diaper    WOUND CARE  Ask your physician for additional care instructions. In general:  ·  Incision:  · May shower and pat incision dry   · Keep the incision clean and dry  · There should not be any opening or pus from the incision  · Continue to walk at home 3 times a day   · Do NOT lift anything heavier than your baby (over 10 pounds)  · Encourage family to help participate in care of the  to allow  "rest and mom time to heal      VAGINAL CARE AND BLEEDING  · Nothing inside vagina for 6 weeks:   · No sexual intercourse, tampons or douching  · Bleeding may continue for 2-4 weeks. Amount and color may vary  · Soaking 1 pad or more in an hour for several hours is considered heavy bleeding  · Passing large egg sized blood clots can be concerning  · If you feel like you have heavy bleeding or are having increasing amount of blood clots call your Obstetrician immediately  · If you begin feeling faint upon standing, feeling sick to your stomach, have clammy skin, a really fast heartbeat, have chills, start feeling confused, dizzy, sleepy or weak, or feeling like you're going to faint call your Obstetrician immediately    HYPERTENSION   Preeclampsia or gestational hypertension are types of high blood pressure that only pregnant women can get. It is important for you to be aware of symptoms to seek early intervention and treatment. If you have any of these symptoms immediately call your Obstetrician    · Vision changes or blurred vision   · Severe headache or pain that is unrelieved with medication and will not go away  · Persistent pain in upper abdomen or shoulder   · Increased swelling of face, feet, or hands  · Difficulty breathing or shortness of breath at rest  · Urinating less than usual    URINATION AND BOWEL MOVEMENTS  · Eating more fiber (bran cereal, fruits, and vegetables) and drinking plenty of fluids will help to avoid constipation  · Urinary frequency and urgency after childbirth is normal  · If you experience any urinary pain, burning or frequency call your provider    BIRTH CONTROL  · It is possible to become pregnant at any time after delivery and while breastfeeding  · Plan to discuss a method of birth control with your physician at your post delivery follow up visit    POSTPARTUM BLUES  During the first few days after birth, you may experience a sense of the \"blues\" which may include impatience, " "irritability or even crying. These feelings come and go quickly. However, as many as 1 in 10 women experience emotional symptoms known as postpartum depression.     POSTPARTUM DEPRESSION    May start as early as the second or third day after delivery or take several weeks or months to develop. Symptoms of \"blues\" are present, but are more intense: Crying spells; loss of appetite; feelings of hopelessness or loss of control; fear of touching the baby; over concern or no concern at all about the baby; little or no concern about your own appearance/caring for yourself; and/or inability to sleep or excessive sleeping. Contact your Obstetrician if you are experiencing any of these symptoms     PREVENTING SHAKEN BABY  If you are angry or stressed, PUT THE BABY IN THE CRIB, step away, take some deep breaths, and wait until you are calm to care for the baby. DO NOT SHAKE THE BABY. You are not alone, call a supporter for help.  · [facility] 24/7 crisis call line (801-435-8813) or (1-592.226.2131)  · You can also text them, text \"ANSWER\" (403775)      "

## 2022-01-23 NOTE — PROGRESS NOTES
0952  Assumed care. fundus 1 fingerbreaths below umbilicus, firm. Light lochia. Pt up self. Gait steady. Denies pain in calves. Pt states pain well managed on pain meds. Pt breastfeeding. Plan to dc today after baby circumcision.     1620  DC paperwork discussed with patient and spouse at bedside. Pt IV dc'd. Flu refused. Pt agreeable to dc plan. Waiting on  to return from circumcision.
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
## 2022-01-27 ENCOUNTER — OFFICE VISIT (OUTPATIENT)
Dept: OBGYN | Facility: CLINIC | Age: 37
End: 2022-01-27
Payer: COMMERCIAL

## 2022-01-27 DIAGNOSIS — O92.79 LACTATION DISORDER, POSTPARTUM CONDITION: ICD-10-CM

## 2022-01-27 PROCEDURE — 99999 PR NO CHARGE: CPT | Performed by: NURSE PRACTITIONER

## 2022-01-27 NOTE — PROGRESS NOTES
Summary: Carmen has done an excellent job establishing her milk supply with pumping. She exclusively  in the hospital. Reports some pain with latching but states it was manageable. Once home from the hospital, parents were concerned he was not getting enough. Started supplementing and pumping. Now feeding every 2.5-3 hours during the day, up to 5 hours at night. Offering 30-40mls every feeding, mostly breastmilk. Using formula if breastmilk is not available. Pumping 3-4x every 24 hours, one side at a time.    Today: Carmen pumped and bottle fed approximately 1 hour before today's appointment. Baby latched to the both breast, cross cradle with the nipple shield. He transferred 14mls from the left breast in 8 minutes and 32mls fromt he right breast in 10 minutes. Baby was content to be dressed and placed in car seat. We were not able to pump today as zuleima was not brought to the appointment.   Plan: Feed baby every 2-2.5 hours throughout the day, up to 4 hours at night. Practice latching throughout the day with the nipple shield. Offer both breast, up to 10-15 minutes. Offer 15-20mls after the breast. Pump after breastfeeding. Pump and bottle feed for the remainder of feedings. Pump and bottle feed throughout the night. Offer 50-60mls if not breastfeeding. If baby still shows hunger cues, offer an additional 15-30mls.   Follow up:   Lactation appointment: 7-10 days  Pediatrician appointment: Today and 2 weeks  Referrals: None    Subjective:     Carmen Brown is a 37 y.o. female here for lactation care. She is here today with babyFranki.    Concerns: Latch on difficulties      HPI:   Pertinent  history: ,   Mother does not have a history of GDM, hypertension prior to pregnancy, insulin resistance, multiple gestation, PCOS and thyroid disease. Common condition(s) which may interfere with milk supply.    Mother does have advanced maternal age. Common condition(s) that may interfere in  milk supply.    Breast changes in pregnancy: Yes  History of breast surgeries: No      FEEDING HISTORY:    Past breastfeeding history: Second biological baby   Hospital course: Exclusively . Reports some pain with latching but states it was manageable.   Currently 1/27/2022: Once home from the hospital, parents were concerned he was not getting enough. Started supplementing and pumping. Now feeding every 2.5-3 hours during the day, up to 5 hours at night. Offering 30-40mls every feeding, mostly breastmilk. Using formula if breastmilk is not available. Pumping 3-4x every 24 hours, one side at a time.     Both breasts: No   Bottle feeds: 6-7/24h  Not on breast, bottle feeding and pumping    Supplement: Expressed breast milk and Formula, not using formula often  Quantity: 30-40mls every 2.5-3 hours  How given/devices:  Bottle    Nipple Shield Use: None    Breast Pumping:   Frequency: 3-4x/24 hours  Type of pump: Dr. Kevin Miller size/type: Standard  NO pain with pumping    Maternal ROS:  Constitutional: No fever, chills. Feeling well  Breasts: No soreness of breasts and No soreness of nipples  Psychiatric: Managing ok  Mental Health: No mention of feeling irritable, agitated, angry, overwhelmed, apathy, exhaustion nor having sleep changes outside infant feeds/demands or appetite changes     Objective:     Maternal Physical   General: No acute distress  Breasts: Symetrical , Full, Plugged Duct - no evidence and Mastitis  - no S/S  Nipples: intact  Psychiatric: Normal mood and affect. Her behavior is normal. Judgment and thought content normal   Mental Health: Did NOT exhibit sadness, crying, feeling overwhelmed, agitation or hypervigilance.    Assessment/Plan & Lactation Counseling:     Infant exam on infant chart    Infant Weight History:   01/22/2022: 7# 11.5oz  01/27/2022: 7# 5oz    Infant intake at Breast:  L  14mls   R  32mls    Total: 46mls  Milk Transfer at this feeding:   Effective breastfeeding, fed  and pumped 1 hour prior to appointment   Pumped: Type of Pump: N/A    Quantity Pumped: N/A   Initiation of Feeding: Infant initiates  Position of Feeding:    Right: cross cradle  Left: cross cradle  Attachment Achieved: rapidly  Nipple shield:   Size: 24mm       Introduced today, 1/27/2022  Latch achieved, yes  Suck Pattern at the breast: Suck burst and normal rest  Suck Pattern on the bottle: N/A   Behavior Following Observed Feeding: content  Nipple Pain: None     Latch: Assisted latch  Suckling/Feeding: attaches, baby falls asleep, baby fed effectively, baby roots, elicits DIAZ, intermittent swallows and rhythmic  Milk Supply Available: normal, establishing well day 5    Maternal Diagnosis/Problem:  Lactation Disorder: Baby Not Latching for Feedings      BREASTFEEDING PLAN  Discussed concerns and symptoms as listed above in assessment and guidance summarized below.  Shared decision making was used between myself and the family for this encounter, home care, and follow up.  Topics reviewed included:  • Herbs and medications  A galactagogue is an herb or medication taken by a breastfeeding mother to increase her milk supply. We know that for centuries mothers around the world have sought out remedies to increase their milk supply. However, there is limited research on the safety and effectiveness of herbal galactagogues, which makes it hard for us to endorse them. It is not known if any of these herbs are truly effective, and it is difficult to predict how a specific herbal galactagogue will affect an individual, requiring “trial and error” in many situations. When effective, results are generally seen within 24-72 hours of starting an herbal galactagogue. Many of these herbs are used to decrease high blood sugar. If you are diabetic or have problems with low blood sugar, or thyroid disease  discuss the use of an herbal galactagogue with your health care provider. Not all women can increase their low milk supply  with a galactagogue due to the many underlying causes of low milk production.  When taking a galactagogue, remember that frequent milk removal is still the most effective way to increase supply.    • Maternal Mental Health: Having a new baby can be joyful time for some new moms, but a difficult time for others. For all, it is a time of profound physical and emotional change. Balancing baby, family, partners and friends, work, pets, and preexisting or new life stressors as well as sleep deprivation can be extremely challenging. Untreated depression and anxiety can contribute to early cessation of breastfeeding, so it is important both for the mental health and physical health of new moms and babies to get on the path to feeling better.   • Self -care through relational support and interaction.   o Encouraged  support, rest, getting out of the house each day, walk or drive somewhere,  a coffee/tea at a drive through  o Allow others to bring you food, help with chores and errands, meeting for a cup of coffee  • Milk supply is dependent on glandular tissue development, hormonal influences, how many times the baby removes milk and how well the breasts are emptied in a 24 hour period. This is a biological reality that we can NOT work around. If, for any reason, your baby is not latching, or you are not able to nurse, then it is important for you to remove the milk instead by pumping or hand expression.  There's no magic trick, tea, food, drink, cookie or supplement that will increase your milk supply. One  must  effectively remove milk to continue to make and maximize milk. In the early days and weeks that can be 8+ times in 24 hours. For older babies, on average 6-7 + times in 24 hours.    • Feeding:   o Feed your baby every 1.5-2.5 hours, more often if baby acts hungry.   o Awaken baby for feeding if going over 2.5 hours in the day.   o Until back to birth weight, ONE four hour at night is acceptable if has had 8  prior feedings in 24 hours.    o Daily goal: 8-12 feedings per 24 hours.   • Supplement:   o Supplement with expressed milk  • If breastfeeding, offer 15-20mls after the breast  • If not breastfeeding, offer 50-60mls  • If baby still shows hunger cues, offer an additional 15-30mls  • When bottle-feeding, there are three primary things to consider:    o Nipple Shape:  - Look for a nipple that looks like a “breast at work” not a “breast at rest.”  A “breast at work” has a somewhat cone shape as the nipple and breast tissue is pulled into the baby’s mouth while feeding.  Your baby’s mouth should be able to go around the widest part of the nipple to form a wide-open gape on the bottle like that of a good latch at the breast. In contrast, a breast at rest might look more like, well, a breast: a roundish base with a  nipple.  If the bottle looks like this, your baby’s lips may not be able to get around the widest part of the nipple because it is just too wide resulting in a narrow gape that would hurt your nipple. This nipple shape may also make it difficult for your baby to make a complete seal with their lips which leads to air intake and milk spillage.Wide or narrow neck bottles are your choice.   o Flow Rate of the Nipple:  - The nipple flow should be slow.  Don’t just read the label, but notice how your baby is feeding and trust what you observe.  A study done a few years ago found that “slow flow” varied widely between brands and even between nipples of the same brand.  Try several nipples until you find one that results in a rhythmic sucking pattern but not chugging and gulping.  o Pacing the feeding:  - A slow flow nipple helps, but how you feed the baby is more important.  Good positioning can compensate for a faster flow nipple.  When bottle-feeding, the baby should control how much is consumed at a feeding.  Holding the baby in an upright position with the bottle horizontal ensures that the baby gets milk only  when sucking.  Here is a nice video demonstrating this concept of paced bottle feeding,  https://www.youtube.com/watch?v=ItUCB1xYO6F  • Pacifier Use:  The American [employer] Ped[employer]ians' Position Paper reports: Although we recommend a conservative approach regarding pacifier use, we do not endorse a complete ban on the use of pacifiers, nor do we support an approach that induces parental guilt concerning their choices about the use of pacifiers.  • Nipple shield (NS): We prefer the 24mm Medela.   o Before applying, roll the shield in on itself (like a sombrero, and allow breast to be pulled  in to the shield tip.   o The latch is very different from the bare breast, bring the baby to you and let them find the nipple shield and work it out.   o Once you and your baby are familiar with the NS, you may be able to just put it on the breast and latch the baby without any preparation.  • Positioning Techniques   o Suggested positions Cross cradle  o Fine tune position by making sure your fingers beneath the breast as well as your bra, are out of the way of your baby's chin.  o Positioning:  Many positions shown, great sidelying at 7 minutes.   o See http://globalhealthmedia.org/portfolio-items/positions-[email or web address]-breastfeeding/?qsobszfihQE=20039  • Latch on Techniques   o Modify for nipple shield use soon enough you will move back to bare breast.  o Fine tune latch:  - By holding your baby more securely at the breast, assisting your baby to stay attached by:  • Bringing your baby to your breast, not breast to the baby  • Your baby's cheek to touch breast securely, nose tipped back  • Hold your baby firmly in place so when your baby forgets to suck and picks it back up again your baby is in the correct spot. You will be extinguishing behavior and replacing it with a deeper latch to stimulate suck and provide satisfaction at the breast.  • Your baby needs as much breast as deep in the mouth as possible to allow your nipples to  "heal and for you more importantly to maximize efficiency at the breast  o Latch is asymmetrical, leading with the chin, getting the baby below the breast, as if offering a large \"deli porter sandwich\".  • Pumping Guidelines:  o Both breasts at the same time  o Pump 8 times in 24 hours  - After or in place of breastfeeding  o Type of pump:  • Dr. Brown   o Always double pump  o How long will vary woman to woman, typically 8-15 minutes, or 1-2 minutes after flow slows  o Flange Fit: Freely moving nipple in the tunnel with some movement of the areola.    • Storage (Acceptable guidelines for healthy term babies)  o 10 hours at room temp including your pieces, may rinse but not mandatory  o 8 days refrigerator, don't need  to refrigerate right away if using fresh pumped milk at the next feeding  o Freezer 1 year  o Deep freezer 2 years  o American Academy or Pediatrics has said you may mix different temperature milks.   o If baby drinks breastmilk from a fresh or refrigerated bottle of breastmilk,  you may return the unused portion to the refrigerator and use once at next feeding.     • Increasing supply besides Galactogogues and Pumping:   o Warmth  o Relaxation   o Physical, auditory narratives  o Childbirth relaxation Techniques  o Acupuncture and acupressure  o Shoulder Massages  o Take a nap      • Connect with other mothers:  o Facebook:   • [place] [facility]: [email or web address]  • Well-Nourished Babies (Private group for questions and support): https://www.facebook.com/groups/901898246845469/  o [facility]  • Tuesdays 10am - 11am. Women's Health at [address], 3rd floor conference room  • Come and check your baby's weight, do a feeding and see how your baby is growing, visit with other mothers, plan on a walk or coffee date after group.  o Please wear a mask  o Due to space limitations - no strollers please (Fresh c/section moms should use the stroller)  o We " would love to have dads stay, but moms won't breastfeed.  o The room is only available from 10am -11am, there is a meeting prior and after.   o All diapers must be taken with you   o Breastfeeding [facility]  - This is an emotional, informational and safe support Nikol[place] with your like-minded community that builds solidarity among moms  - The honest experiences of mothers are highly valued among the other mothers  - Tuesday  at 11am by Zoom,  you will be sent an invitation each week, please unsubscribe as you wish  - You may instead copy and paste this link: [email or web address]    - Passcode  [id number]  - You may share this link with friends; please don't post on social media.      In Conclusion:   Family present has verbalized what they can realistically do based on family dynamics, understanding a plan has to be doable to be effective and can be renegotiated at any time.  This is a complex and intimate journey. When obstacles present themselves, it takes confidence, persistence and support. You are now the focus of our Breastfeeding Medicine team; we are here to support your decisions and goals.      Follow up requires close monitoring in this time sensitive window of opportunity to establish milk supply and facilitate the learning of  breastfeeding.    Mom is encouraged to e-mail to update how the plan is working.    Pediatrician appointment: Today and 2 week well check    Follow-up for infant weight check and dyad breastfeeding evaluation in 7-10 day(s)  Please call [phone] if you have not scheduled your next appointment    A total of 60 minutes, not including infant assessment time, with more than 50% was spent preparing to see the patient, obtaining and reviewing separately obtained history, performing a medically appropriate examination and evaluation, counseling and educating the family, referring and communicating with other health care  professionals, documenting clinical information in the electronic health record, independently interpreting weighted feeds and infant growth results, communicating these results to the family and care coordination as detailed in the above note.     Cruz FOX, IBCLC    Donna Estrada

## 2022-06-06 ENCOUNTER — HOSPITAL ENCOUNTER (OUTPATIENT)
Facility: MEDICAL CENTER | Age: 37
End: 2022-06-06
Attending: NURSE PRACTITIONER
Payer: COMMERCIAL

## 2022-06-06 ENCOUNTER — OFFICE VISIT (OUTPATIENT)
Dept: URGENT CARE | Facility: CLINIC | Age: 37
End: 2022-06-06
Payer: COMMERCIAL

## 2022-06-06 VITALS
RESPIRATION RATE: 18 BRPM | HEART RATE: 108 BPM | HEIGHT: 68 IN | DIASTOLIC BLOOD PRESSURE: 70 MMHG | BODY MASS INDEX: 23.19 KG/M2 | TEMPERATURE: 98.2 F | SYSTOLIC BLOOD PRESSURE: 104 MMHG | OXYGEN SATURATION: 98 % | WEIGHT: 153 LBS

## 2022-06-06 DIAGNOSIS — R50.9 FEVER, UNSPECIFIED FEVER CAUSE: ICD-10-CM

## 2022-06-06 DIAGNOSIS — J01.90 ACUTE NON-RECURRENT SINUSITIS, UNSPECIFIED LOCATION: ICD-10-CM

## 2022-06-06 DIAGNOSIS — J22 LRTI (LOWER RESPIRATORY TRACT INFECTION): ICD-10-CM

## 2022-06-06 DIAGNOSIS — R11.0 NAUSEA: ICD-10-CM

## 2022-06-06 PROCEDURE — 99214 OFFICE O/P EST MOD 30 MIN: CPT | Performed by: NURSE PRACTITIONER

## 2022-06-06 PROCEDURE — U0003 INFECTIOUS AGENT DETECTION BY NUCLEIC ACID (DNA OR RNA); SEVERE ACUTE RESPIRATORY SYNDROME CORONAVIRUS 2 (SARS-COV-2) (CORONAVIRUS DISEASE [COVID-19]), AMPLIFIED PROBE TECHNIQUE, MAKING USE OF HIGH THROUGHPUT TECHNOLOGIES AS DESCRIBED BY CMS-2020-01-R: HCPCS

## 2022-06-06 PROCEDURE — U0005 INFEC AGEN DETEC AMPLI PROBE: HCPCS

## 2022-06-06 RX ORDER — AMOXICILLIN AND CLAVULANATE POTASSIUM 875; 125 MG/1; MG/1
1 TABLET, FILM COATED ORAL 2 TIMES DAILY
Qty: 10 TABLET | Refills: 0 | Status: SHIPPED | OUTPATIENT
Start: 2022-06-06 | End: 2022-06-11

## 2022-06-06 RX ORDER — ONDANSETRON 4 MG/1
4 TABLET, ORALLY DISINTEGRATING ORAL EVERY 6 HOURS PRN
Qty: 10 TABLET | Refills: 0 | Status: SHIPPED | OUTPATIENT
Start: 2022-06-06

## 2022-06-06 ASSESSMENT — ENCOUNTER SYMPTOMS
DIARRHEA: 1
NAUSEA: 1
SHORTNESS OF BREATH: 1
FEVER: 1
ABDOMINAL PAIN: 0
COUGH: 1
CHILLS: 1
SINUS PAIN: 1

## 2022-06-06 ASSESSMENT — VISUAL ACUITY: OU: 1

## 2022-06-06 ASSESSMENT — FIBROSIS 4 INDEX: FIB4 SCORE: 1.65

## 2022-06-06 NOTE — LETTER
June 6, 2022         Patient: Carmen Brown   YOB: 1985   Date of Visit: 6/6/2022           To Whom it May Concern:    Carmen Brown was seen in my clinic on 6/6/2022 due to illness. Due to medical necessity, please excuse patient from work for the past 1 week as well as for up to the next 3 days as needed.     If you have any questions or concerns, please don't hesitate to call.        Sincerely,           CYRIL Klein.  Electronically Signed

## 2022-06-06 NOTE — PROGRESS NOTES
Subjective:     Carmen Brown is a 37 y.o. female who presents for Other (Been ill for a week, hot sweats, cold chills, the patient thought she was getting better 2 days ago. Had diarrhea and not able to eat, but now can. Runny nose last night and cough, hot sweats again last night. Tx: Vitamins and NyQuil. )       Fever   This is a new problem. The problem has been waxing and waning. Associated symptoms include congestion, coughing, diarrhea and nausea. Pertinent negatives include no abdominal pain or ear pain.   Risk factors: no contaminated food, no contaminated water, no recent travel and no sick contacts      Patient concerned of infectious process given the severity and duration of her symptoms.    Patient was screened prior to rooming and denied COVID-19 diagnosis or contact with a person who has been diagnosed or is suspected to have COVID-19. During this visit, appropriate PPE was worn, hand hygiene was performed, and the patient and any visitors were masked.     PMH:  has a past medical history of Hepatitis C (Dx 2002) and Vitamin D deficiency (6/24/2021).    She has no past medical history of Tuberculosis.    MEDS:   Current Outpatient Medications:   •  IUD'S IU, by Intrauterine route. Been on it for 2 months now (6/6/22), Disp: , Rfl:   •  amoxicillin-clavulanate (AUGMENTIN) 875-125 MG Tab, Take 1 Tablet by mouth 2 times a day for 5 days., Disp: 10 Tablet, Rfl: 0  •  ondansetron (ZOFRAN ODT) 4 MG TABLET DISPERSIBLE, Take 1 Tablet by mouth every 6 hours as needed for Nausea. Dissolve in mouth., Disp: 10 Tablet, Rfl: 0  •  Prenatal MV-Min-Fe Fum-FA-DHA (PRENATAL 1 PO), Take 1 Tablet by mouth every day., Disp: , Rfl:     ALLERGIES: No Known Allergies    SURGHX:   Past Surgical History:   Procedure Laterality Date   • PRIMARY C SECTION  9/13/2011    Performed by JEREMIE MIRAMONTES at [facility]     SOCHX:  reports that she quit smoking about 4 years ago. Her smoking use included cigarettes. She  "started smoking about 23 years ago. She has a 7.50 pack-year smoking history. She has never used smokeless tobacco. She reports previous alcohol use of about 0.6 oz of alcohol per week. She reports that she does not use drugs.     FH: Reviewed with patient, not pertinent to this visit.    Review of Systems   Constitutional: Positive for chills, fever and malaise/fatigue.   HENT: Positive for congestion and sinus pain. Negative for ear pain.    Respiratory: Positive for cough and shortness of breath.    Gastrointestinal: Positive for diarrhea and nausea. Negative for abdominal pain.   All other systems reviewed and are negative.    Additional details per HPI.      Objective:     /70 (BP Location: Left arm, Patient Position: Sitting, BP Cuff Size: Adult)   Pulse (!) 108   Temp 36.8 °C (98.2 °F) (Temporal)   Resp 18   Ht 1.715 m (5' 7.5\")   Wt 69.4 kg (153 lb)   SpO2 98%   Breastfeeding No Comment: IUD for 2 months now 6/6/22  BMI 23.61 kg/m²     Physical Exam  Vitals reviewed.   Constitutional:       General: She is not in acute distress.     Appearance: She is well-developed. She is ill-appearing. She is not toxic-appearing.   HENT:      Head: Normocephalic.      Right Ear: External ear normal.      Left Ear: External ear normal.      Nose: Congestion present.      Mouth/Throat:      Mouth: Mucous membranes are moist.      Pharynx: Oropharynx is clear.   Eyes:      General: Vision grossly intact.      Extraocular Movements: Extraocular movements intact.      Conjunctiva/sclera: Conjunctivae normal.   Cardiovascular:      Rate and Rhythm: Regular rhythm. Tachycardia present.      Heart sounds: Normal heart sounds.   Pulmonary:      Effort: Pulmonary effort is normal. No respiratory distress.      Breath sounds: Rhonchi present. No decreased breath sounds.   Abdominal:      General: Bowel sounds are normal.      Palpations: Abdomen is soft.      Tenderness: There is no abdominal tenderness. "   Musculoskeletal:         General: No deformity. Normal range of motion.      Cervical back: Normal range of motion.   Skin:     General: Skin is warm and dry.      Coloration: Skin is not pale.   Neurological:      Mental Status: She is alert and oriented to person, place, and time.      Sensory: No sensory deficit.      Motor: No weakness.   Psychiatric:         Behavior: Behavior normal. Behavior is cooperative.       Assessment/Plan:     1. Fever, unspecified fever cause  - COVID/SARS CoV-2 PCR; Future    2. Nausea  - ondansetron (ZOFRAN ODT) 4 MG TABLET DISPERSIBLE; Take 1 Tablet by mouth every 6 hours as needed for Nausea. Dissolve in mouth.  Dispense: 10 Tablet; Refill: 0    3. Acute non-recurrent sinusitis, unspecified location  - amoxicillin-clavulanate (AUGMENTIN) 875-125 MG Tab; Take 1 Tablet by mouth 2 times a day for 5 days.  Dispense: 10 Tablet; Refill: 0    4. LRTI (lower respiratory tract infection)  - amoxicillin-clavulanate (AUGMENTIN) 875-125 MG Tab; Take 1 Tablet by mouth 2 times a day for 5 days.  Dispense: 10 Tablet; Refill: 0    Differential diagnosis, natural history, supportive care, rest, fluids, over-the-counter symptom management per 's instructions, ibuprofen, APAP, close monitoring, and indications for immediate follow-up discussed.     All questions answered. Patient agrees with the plan of care.    Discharge summary provided.    Work note provided.     Billing note: 30 minutes was allotted and spent for patient care and coordination of care (not reported separately) including preparing for the visit, obtaining/reviewing history, performing an exam/evaluation, ordering Rx/testing, developing a plan of care, counseling/educating the patient, developing/printing/going over the discharge summary with the patient, producing a work note, and documentation. Care specific to this encounter was summarized here. Please refer to the chart for additional details on the care provided.

## 2022-06-07 DIAGNOSIS — R50.9 FEVER, UNSPECIFIED FEVER CAUSE: ICD-10-CM

## 2022-06-07 LAB
COVID ORDER STATUS COVID19: NORMAL
SARS-COV-2 RNA RESP QL NAA+PROBE: DETECTED
SPECIMEN SOURCE: ABNORMAL

## 2025-03-30 ENCOUNTER — OFFICE VISIT (OUTPATIENT)
Dept: URGENT CARE | Facility: CLINIC | Age: 40
End: 2025-03-30
Payer: COMMERCIAL

## 2025-03-30 VITALS
DIASTOLIC BLOOD PRESSURE: 68 MMHG | WEIGHT: 142 LBS | HEIGHT: 67 IN | HEART RATE: 95 BPM | RESPIRATION RATE: 16 BRPM | TEMPERATURE: 97.3 F | BODY MASS INDEX: 22.29 KG/M2 | SYSTOLIC BLOOD PRESSURE: 102 MMHG | OXYGEN SATURATION: 100 %

## 2025-03-30 DIAGNOSIS — L08.9 SKIN INFECTION: ICD-10-CM

## 2025-03-30 RX ORDER — CEPHALEXIN 500 MG/1
500 CAPSULE ORAL 4 TIMES DAILY
Qty: 20 CAPSULE | Refills: 0 | Status: SHIPPED | OUTPATIENT
Start: 2025-03-30 | End: 2025-04-04

## 2025-03-30 RX ORDER — ALBUTEROL SULFATE 90 UG/1
INHALANT RESPIRATORY (INHALATION)
COMMUNITY
Start: 2025-03-06

## 2025-03-30 RX ORDER — CLOTRIMAZOLE AND BETAMETHASONE DIPROPIONATE 10; .64 MG/G; MG/G
1 CREAM TOPICAL 2 TIMES DAILY
Qty: 45 G | Refills: 0 | Status: SHIPPED | OUTPATIENT
Start: 2025-03-30

## 2025-03-30 NOTE — PROGRESS NOTES
Subjective:     Verbal consent was acquired by the patient to use [employer] ambient listening note generation during this visit     Carmen Brown is a 40 y.o. female who presents for Rash (Rash on buttocks, slightly itchy and painful x 4 days)       History of Present Illness  The patient presents for evaluation of a rash on her buttocks.    She reports the presence of a rash on her buttocks, specifically in the area where she sits. This is her first experience with such a condition, which has been persisting for approximately 4 to 5 days. The rash initially presented as a dry spot, which she did not consider significant. However, upon inspection two days later, she noticed an alteration in its appearance. She attempted to alleviate the condition by taking a baking soda bath, but this intervention seemed to exacerbate the rash. The rash has evolved from a dry skin condition to a more pronounced lesion, causing discomfort due to its location. She also reports a slight increase in the size of the lesion and the presence of pus. She has not applied any Band-Aid or Neosporin to the affected area. She works from home and does not engage in any activities that could potentially expose her to hot tubs or similar environments. She recently relocated from [facility], where there was an outbreak of impetigo at her son's school, although her son did not contract the disease. She also mentions that her friend's son had a cough around the same time. She has no personal history of eczema.            Medications:  albuterol Aers  IUD'S IU  ondansetron Tbdp  PRENATAL 1 PO    Allergies:             Patient has no known allergies.    Past Social Hx:  Carmen Brown  reports that she quit smoking about 6 years ago. Her smoking use included cigarettes. She started smoking about 26 years ago. She has a 9.7 pack-year smoking history. She has never used smokeless tobacco. She reports current alcohol use of about 0.6 oz of  "alcohol per week. She reports that she does not use drugs.           Problem list, medications, and allergies reviewed by myself today in Epic.     Objective:     /68 (BP Location: Left arm, Patient Position: Sitting, BP Cuff Size: Adult)   Pulse 95   Temp 36.3 °C (97.3 °F) (Temporal)   Resp 16   Ht 1.702 m (5' 7\")   Wt 64.4 kg (142 lb)   SpO2 100%   BMI 22.24 kg/m²     Physical Exam  Vitals and nursing note reviewed.   Constitutional:       General: She is not in acute distress.     Appearance: Normal appearance. She is not ill-appearing.   HENT:      Head: Normocephalic.   Eyes:      Extraocular Movements: Extraocular movements intact.      Pupils: Pupils are equal, round, and reactive to light.   Cardiovascular:      Rate and Rhythm: Normal rate.   Pulmonary:      Effort: Pulmonary effort is normal.   Musculoskeletal:        Legs:       Comments: Gluteal lesion, approx 2 cm erythematous with rolled edge border, mildly excoriated, c/w possible nummular eczema vs. Tinea with mild central clearing.   Skin:     General: Skin is warm.      Findings: No rash.   Neurological:      Mental Status: She is alert and oriented to person, place, and time.   Psychiatric:         Thought Content: Thought content normal.         Judgment: Judgment normal.                 Assessment/Plan:     Diagnosis and Associated Orders:     1. Skin infection  - cephALEXin (KEFLEX) 500 MG Cap; Take 1 Capsule by mouth 4 times a day for 5 days.  Dispense: 20 Capsule; Refill: 0  - clotrimazole-betamethasone (LOTRISONE) 1-0.05 % Cream; Apply 1 Application topically 2 times a day.  Dispense: 45 g; Refill: 0    Other orders  - albuterol 108 (90 Base) MCG/ACT Aero Soln inhalation aerosol; INHALE 1 PUFF BY MOUTH EVERY 4 HOURS AS NEEDED        Medical Decision Making:    Pleasant 40 y.o. presents to clinic with:    Assessment & Plan  1. Rash on the buttocks.  The differential diagnosis includes ringworm and nummular eczema, although mild " pustular appearance with exposure to impetigo is concerning.  Given the raised border and central clearing of the lesion. Impetigo is less likely due to the absence of honey-colored crusting lesions. A prescription for a topical cream containing both an antifungal and a steroid will be provided, to be applied twice daily. Additionally, Keflex will be prescribed as an oral antibiotic. If the rash spreads or new lesions appear, further evaluation will be necessary.     I personally reviewed prior external notes and test results pertinent to today's visit. Patient is clinically stable at today's urgent care visit.  Patient appears nontoxic with no acute distress noted. Appropriate for outpatient care at this time.  Return to clinic or go to ED if symptoms worsen or persist.  Red flag symptoms discussed.  Patient/Parent/Guardian voices understanding.   All side effects of medication discussed including allergic response, GI upset, tendon injury, rash, sedation etc    Please note that this dictation was created using voice recognition software. I have made a reasonable attempt to correct obvious errors, but I expect that there are errors of grammar and possibly content that I did not discover before finalizing the note.    This note was electronically signed by Mary Mascorro PA-C